# Patient Record
Sex: FEMALE | Race: WHITE | NOT HISPANIC OR LATINO | Employment: OTHER | URBAN - METROPOLITAN AREA
[De-identification: names, ages, dates, MRNs, and addresses within clinical notes are randomized per-mention and may not be internally consistent; named-entity substitution may affect disease eponyms.]

---

## 2021-09-23 ENCOUNTER — LAB REQUISITION (OUTPATIENT)
Dept: LAB | Facility: CLINIC | Age: 86
End: 2021-09-23
Payer: MEDICAID

## 2021-09-23 DIAGNOSIS — F03.90 UNSPECIFIED DEMENTIA WITHOUT BEHAVIORAL DISTURBANCE: ICD-10-CM

## 2021-09-23 DIAGNOSIS — E55.9 VITAMIN D DEFICIENCY, UNSPECIFIED: ICD-10-CM

## 2021-09-24 LAB
ALBUMIN SERPL-MCNC: 3.3 G/DL (ref 3.5–5)
ALP SERPL-CCNC: 89 U/L (ref 45–120)
ALT SERPL W P-5'-P-CCNC: 14 U/L (ref 0–45)
ANION GAP SERPL CALCULATED.3IONS-SCNC: 12 MMOL/L (ref 5–18)
AST SERPL W P-5'-P-CCNC: 18 U/L (ref 0–40)
BILIRUB SERPL-MCNC: 0.4 MG/DL (ref 0–1)
BUN SERPL-MCNC: 17 MG/DL (ref 8–28)
CALCIUM SERPL-MCNC: 8.6 MG/DL (ref 8.5–10.5)
CHLORIDE BLD-SCNC: 105 MMOL/L (ref 98–107)
CO2 SERPL-SCNC: 25 MMOL/L (ref 22–31)
CREAT SERPL-MCNC: 0.87 MG/DL (ref 0.6–1.1)
ERYTHROCYTE [DISTWIDTH] IN BLOOD BY AUTOMATED COUNT: 13.1 % (ref 10–15)
GFR SERPL CREATININE-BSD FRML MDRD: 60 ML/MIN/1.73M2
GLUCOSE BLD-MCNC: 92 MG/DL (ref 70–125)
HCT VFR BLD AUTO: 43.3 % (ref 35–47)
HGB BLD-MCNC: 14.2 G/DL (ref 11.7–15.7)
MCH RBC QN AUTO: 32.1 PG (ref 26.5–33)
MCHC RBC AUTO-ENTMCNC: 32.8 G/DL (ref 31.5–36.5)
MCV RBC AUTO: 98 FL (ref 78–100)
PLATELET # BLD AUTO: 314 10E3/UL (ref 150–450)
POTASSIUM BLD-SCNC: 4.2 MMOL/L (ref 3.5–5)
PROT SERPL-MCNC: 6.1 G/DL (ref 6–8)
RBC # BLD AUTO: 4.43 10E6/UL (ref 3.8–5.2)
SODIUM SERPL-SCNC: 142 MMOL/L (ref 136–145)
TSH SERPL DL<=0.005 MIU/L-ACNC: 3.48 UIU/ML (ref 0.3–5)
VIT B12 SERPL-MCNC: 269 PG/ML (ref 213–816)
WBC # BLD AUTO: 3.7 10E3/UL (ref 4–11)

## 2021-09-24 PROCEDURE — 80053 COMPREHEN METABOLIC PANEL: CPT | Mod: ORL | Performed by: INTERNAL MEDICINE

## 2021-09-24 PROCEDURE — 85027 COMPLETE CBC AUTOMATED: CPT | Mod: ORL | Performed by: INTERNAL MEDICINE

## 2021-09-24 PROCEDURE — 36415 COLL VENOUS BLD VENIPUNCTURE: CPT | Mod: ORL | Performed by: INTERNAL MEDICINE

## 2021-09-24 PROCEDURE — 82607 VITAMIN B-12: CPT | Mod: ORL | Performed by: INTERNAL MEDICINE

## 2021-09-24 PROCEDURE — 84443 ASSAY THYROID STIM HORMONE: CPT | Mod: ORL | Performed by: INTERNAL MEDICINE

## 2021-09-24 PROCEDURE — 82306 VITAMIN D 25 HYDROXY: CPT | Mod: ORL | Performed by: INTERNAL MEDICINE

## 2021-09-27 LAB — DEPRECATED CALCIDIOL+CALCIFEROL SERPL-MC: 13 UG/L (ref 30–80)

## 2022-03-03 ENCOUNTER — LAB REQUISITION (OUTPATIENT)
Dept: LAB | Facility: CLINIC | Age: 87
End: 2022-03-03
Payer: COMMERCIAL

## 2022-03-03 DIAGNOSIS — E03.9 HYPOTHYROIDISM, UNSPECIFIED: ICD-10-CM

## 2022-03-03 DIAGNOSIS — E55.9 VITAMIN D DEFICIENCY, UNSPECIFIED: ICD-10-CM

## 2022-03-03 DIAGNOSIS — I10 ESSENTIAL (PRIMARY) HYPERTENSION: ICD-10-CM

## 2022-03-04 LAB
ALBUMIN SERPL-MCNC: 3.3 G/DL (ref 3.5–5)
ALP SERPL-CCNC: 92 U/L (ref 45–120)
ALT SERPL W P-5'-P-CCNC: 9 U/L (ref 0–45)
ANION GAP SERPL CALCULATED.3IONS-SCNC: 9 MMOL/L (ref 5–18)
AST SERPL W P-5'-P-CCNC: 14 U/L (ref 0–40)
BILIRUB SERPL-MCNC: 0.3 MG/DL (ref 0–1)
BUN SERPL-MCNC: 14 MG/DL (ref 8–28)
CALCIUM SERPL-MCNC: 9.2 MG/DL (ref 8.5–10.5)
CHLORIDE BLD-SCNC: 103 MMOL/L (ref 98–107)
CO2 SERPL-SCNC: 27 MMOL/L (ref 22–31)
CREAT SERPL-MCNC: 0.9 MG/DL (ref 0.6–1.1)
ERYTHROCYTE [DISTWIDTH] IN BLOOD BY AUTOMATED COUNT: 13.9 % (ref 10–15)
GFR SERPL CREATININE-BSD FRML MDRD: 61 ML/MIN/1.73M2
GLUCOSE BLD-MCNC: 100 MG/DL (ref 70–125)
HCT VFR BLD AUTO: 43.4 % (ref 35–47)
HGB BLD-MCNC: 14.6 G/DL (ref 11.7–15.7)
MCH RBC QN AUTO: 31.5 PG (ref 26.5–33)
MCHC RBC AUTO-ENTMCNC: 33.6 G/DL (ref 31.5–36.5)
MCV RBC AUTO: 94 FL (ref 78–100)
PLATELET # BLD AUTO: 309 10E3/UL (ref 150–450)
POTASSIUM BLD-SCNC: 4 MMOL/L (ref 3.5–5)
PROT SERPL-MCNC: 6.7 G/DL (ref 6–8)
RBC # BLD AUTO: 4.64 10E6/UL (ref 3.8–5.2)
SODIUM SERPL-SCNC: 139 MMOL/L (ref 136–145)
TSH SERPL DL<=0.005 MIU/L-ACNC: 4.44 UIU/ML (ref 0.3–5)
WBC # BLD AUTO: 4.1 10E3/UL (ref 4–11)

## 2022-03-04 PROCEDURE — 84443 ASSAY THYROID STIM HORMONE: CPT | Mod: ORL | Performed by: PHYSICIAN ASSISTANT

## 2022-03-04 PROCEDURE — 85027 COMPLETE CBC AUTOMATED: CPT | Mod: ORL | Performed by: PHYSICIAN ASSISTANT

## 2022-03-04 PROCEDURE — 36415 COLL VENOUS BLD VENIPUNCTURE: CPT | Mod: ORL | Performed by: PHYSICIAN ASSISTANT

## 2022-03-04 PROCEDURE — P9604 ONE-WAY ALLOW PRORATED TRIP: HCPCS | Mod: ORL | Performed by: PHYSICIAN ASSISTANT

## 2022-03-04 PROCEDURE — 80053 COMPREHEN METABOLIC PANEL: CPT | Mod: ORL | Performed by: PHYSICIAN ASSISTANT

## 2022-03-04 PROCEDURE — 82306 VITAMIN D 25 HYDROXY: CPT | Mod: ORL | Performed by: PHYSICIAN ASSISTANT

## 2022-03-07 LAB — DEPRECATED CALCIDIOL+CALCIFEROL SERPL-MC: 27 UG/L (ref 30–80)

## 2022-11-02 ENCOUNTER — LAB REQUISITION (OUTPATIENT)
Dept: LAB | Facility: CLINIC | Age: 87
End: 2022-11-02
Payer: MEDICARE

## 2022-11-02 DIAGNOSIS — E55.9 VITAMIN D DEFICIENCY, UNSPECIFIED: ICD-10-CM

## 2022-11-02 DIAGNOSIS — F03.90 UNSPECIFIED DEMENTIA, UNSPECIFIED SEVERITY, WITHOUT BEHAVIORAL DISTURBANCE, PSYCHOTIC DISTURBANCE, MOOD DISTURBANCE, AND ANXIETY (H): ICD-10-CM

## 2022-11-04 LAB
ALBUMIN SERPL BCG-MCNC: 3.9 G/DL (ref 3.5–5.2)
ALP SERPL-CCNC: 80 U/L (ref 35–104)
ALT SERPL W P-5'-P-CCNC: 10 U/L (ref 10–35)
ANION GAP SERPL CALCULATED.3IONS-SCNC: 12 MMOL/L (ref 7–15)
AST SERPL W P-5'-P-CCNC: 18 U/L (ref 10–35)
BASOPHILS # BLD AUTO: 0 10E3/UL (ref 0–0.2)
BASOPHILS NFR BLD AUTO: 1 %
BILIRUB SERPL-MCNC: 0.2 MG/DL
BUN SERPL-MCNC: 21.1 MG/DL (ref 8–23)
CALCIUM SERPL-MCNC: 9.9 MG/DL (ref 8.8–10.2)
CHLORIDE SERPL-SCNC: 99 MMOL/L (ref 98–107)
CREAT SERPL-MCNC: 1.12 MG/DL (ref 0.51–0.95)
DEPRECATED CALCIDIOL+CALCIFEROL SERPL-MC: 37 UG/L (ref 20–75)
DEPRECATED HCO3 PLAS-SCNC: 26 MMOL/L (ref 22–29)
EOSINOPHIL # BLD AUTO: 0.1 10E3/UL (ref 0–0.7)
EOSINOPHIL NFR BLD AUTO: 2 %
ERYTHROCYTE [DISTWIDTH] IN BLOOD BY AUTOMATED COUNT: 13.2 % (ref 10–15)
GFR SERPL CREATININE-BSD FRML MDRD: 47 ML/MIN/1.73M2
GLUCOSE SERPL-MCNC: 100 MG/DL (ref 70–99)
HCT VFR BLD AUTO: 45.1 % (ref 35–47)
HGB BLD-MCNC: 14.9 G/DL (ref 11.7–15.7)
IMM GRANULOCYTES # BLD: 0 10E3/UL
IMM GRANULOCYTES NFR BLD: 0 %
LYMPHOCYTES # BLD AUTO: 1.5 10E3/UL (ref 0.8–5.3)
LYMPHOCYTES NFR BLD AUTO: 36 %
MCH RBC QN AUTO: 31.9 PG (ref 26.5–33)
MCHC RBC AUTO-ENTMCNC: 33 G/DL (ref 31.5–36.5)
MCV RBC AUTO: 97 FL (ref 78–100)
MONOCYTES # BLD AUTO: 0.5 10E3/UL (ref 0–1.3)
MONOCYTES NFR BLD AUTO: 12 %
NEUTROPHILS # BLD AUTO: 2.1 10E3/UL (ref 1.6–8.3)
NEUTROPHILS NFR BLD AUTO: 49 %
NRBC # BLD AUTO: 0 10E3/UL
NRBC BLD AUTO-RTO: 0 /100
PLATELET # BLD AUTO: 289 10E3/UL (ref 150–450)
POTASSIUM SERPL-SCNC: 4.3 MMOL/L (ref 3.4–5.3)
PROT SERPL-MCNC: 6.8 G/DL (ref 6.4–8.3)
RBC # BLD AUTO: 4.67 10E6/UL (ref 3.8–5.2)
SODIUM SERPL-SCNC: 137 MMOL/L (ref 136–145)
TSH SERPL DL<=0.005 MIU/L-ACNC: 5.88 UIU/ML (ref 0.3–4.2)
VIT B12 SERPL-MCNC: 432 PG/ML (ref 232–1245)
WBC # BLD AUTO: 4.3 10E3/UL (ref 4–11)

## 2022-11-04 PROCEDURE — P9603 ONE-WAY ALLOW PRORATED MILES: HCPCS | Mod: ORL | Performed by: INTERNAL MEDICINE

## 2022-11-04 PROCEDURE — 84443 ASSAY THYROID STIM HORMONE: CPT | Mod: ORL | Performed by: INTERNAL MEDICINE

## 2022-11-04 PROCEDURE — 80053 COMPREHEN METABOLIC PANEL: CPT | Mod: ORL | Performed by: INTERNAL MEDICINE

## 2022-11-04 PROCEDURE — 85025 COMPLETE CBC W/AUTO DIFF WBC: CPT | Mod: ORL | Performed by: INTERNAL MEDICINE

## 2022-11-04 PROCEDURE — 36415 COLL VENOUS BLD VENIPUNCTURE: CPT | Mod: ORL | Performed by: INTERNAL MEDICINE

## 2022-11-04 PROCEDURE — 82306 VITAMIN D 25 HYDROXY: CPT | Mod: ORL | Performed by: INTERNAL MEDICINE

## 2022-11-04 PROCEDURE — 82607 VITAMIN B-12: CPT | Mod: ORL | Performed by: INTERNAL MEDICINE

## 2022-12-07 ENCOUNTER — LAB REQUISITION (OUTPATIENT)
Dept: LAB | Facility: CLINIC | Age: 87
End: 2022-12-07
Payer: COMMERCIAL

## 2022-12-07 ENCOUNTER — LAB REQUISITION (OUTPATIENT)
Dept: LAB | Facility: CLINIC | Age: 87
End: 2022-12-07
Payer: MEDICARE

## 2022-12-07 DIAGNOSIS — E03.9 HYPOTHYROIDISM, UNSPECIFIED: ICD-10-CM

## 2022-12-07 DIAGNOSIS — F10.120 ALCOHOL ABUSE WITH INTOXICATION, UNCOMPLICATED (H): ICD-10-CM

## 2022-12-07 DIAGNOSIS — I10 ESSENTIAL (PRIMARY) HYPERTENSION: ICD-10-CM

## 2022-12-09 LAB
ALBUMIN SERPL BCG-MCNC: 3.8 G/DL (ref 3.5–5.2)
ALP SERPL-CCNC: 74 U/L (ref 35–104)
ALT SERPL W P-5'-P-CCNC: 8 U/L (ref 10–35)
ANION GAP SERPL CALCULATED.3IONS-SCNC: 11 MMOL/L (ref 7–15)
AST SERPL W P-5'-P-CCNC: 18 U/L (ref 10–35)
BILIRUB SERPL-MCNC: 0.3 MG/DL
BUN SERPL-MCNC: 25 MG/DL (ref 8–23)
CALCIUM SERPL-MCNC: 9.4 MG/DL (ref 8.8–10.2)
CHLORIDE SERPL-SCNC: 104 MMOL/L (ref 98–107)
CREAT SERPL-MCNC: 1.09 MG/DL (ref 0.51–0.95)
DEPRECATED HCO3 PLAS-SCNC: 27 MMOL/L (ref 22–29)
FOLATE SERPL-MCNC: 5.6 NG/ML (ref 4.6–34.8)
GFR SERPL CREATININE-BSD FRML MDRD: 48 ML/MIN/1.73M2
GLUCOSE SERPL-MCNC: 82 MG/DL (ref 70–99)
POTASSIUM SERPL-SCNC: 4.7 MMOL/L (ref 3.4–5.3)
PROT SERPL-MCNC: 6.7 G/DL (ref 6.4–8.3)
SODIUM SERPL-SCNC: 142 MMOL/L (ref 136–145)
T4 SERPL-MCNC: 4.6 UG/DL (ref 4.5–11.7)
TSH SERPL DL<=0.005 MIU/L-ACNC: 6.77 UIU/ML (ref 0.3–4.2)

## 2022-12-09 PROCEDURE — 84436 ASSAY OF TOTAL THYROXINE: CPT | Mod: ORL | Performed by: INTERNAL MEDICINE

## 2022-12-09 PROCEDURE — 80053 COMPREHEN METABOLIC PANEL: CPT | Mod: ORL | Performed by: INTERNAL MEDICINE

## 2022-12-09 PROCEDURE — P9603 ONE-WAY ALLOW PRORATED MILES: HCPCS | Mod: ORL | Performed by: INTERNAL MEDICINE

## 2022-12-09 PROCEDURE — 82746 ASSAY OF FOLIC ACID SERUM: CPT | Mod: ORL | Performed by: INTERNAL MEDICINE

## 2022-12-09 PROCEDURE — 36415 COLL VENOUS BLD VENIPUNCTURE: CPT | Mod: ORL | Performed by: INTERNAL MEDICINE

## 2022-12-09 PROCEDURE — 84443 ASSAY THYROID STIM HORMONE: CPT | Mod: ORL | Performed by: INTERNAL MEDICINE

## 2022-12-09 PROCEDURE — 80199 DRUG SCREEN QUANT TIAGABINE: CPT | Mod: ORL | Performed by: INTERNAL MEDICINE

## 2022-12-23 LAB — TIAGABINE SERPL-MCNC: NORMAL NG/ML

## 2023-02-02 ENCOUNTER — LAB REQUISITION (OUTPATIENT)
Dept: LAB | Facility: CLINIC | Age: 88
End: 2023-02-02
Payer: MEDICARE

## 2023-02-02 DIAGNOSIS — E03.9 HYPOTHYROIDISM, UNSPECIFIED: ICD-10-CM

## 2023-02-03 LAB
T3 SERPL-MCNC: 58 NG/DL (ref 85–202)
T4 FREE SERPL-MCNC: 0.81 NG/DL (ref 0.9–1.7)
TSH SERPL DL<=0.005 MIU/L-ACNC: 4.94 UIU/ML (ref 0.3–4.2)

## 2023-02-03 PROCEDURE — 84439 ASSAY OF FREE THYROXINE: CPT | Mod: ORL | Performed by: INTERNAL MEDICINE

## 2023-02-03 PROCEDURE — 36415 COLL VENOUS BLD VENIPUNCTURE: CPT | Mod: ORL | Performed by: INTERNAL MEDICINE

## 2023-02-03 PROCEDURE — 84443 ASSAY THYROID STIM HORMONE: CPT | Mod: ORL | Performed by: INTERNAL MEDICINE

## 2023-02-03 PROCEDURE — P9603 ONE-WAY ALLOW PRORATED MILES: HCPCS | Mod: ORL | Performed by: INTERNAL MEDICINE

## 2023-02-03 PROCEDURE — 84480 ASSAY TRIIODOTHYRONINE (T3): CPT | Mod: ORL | Performed by: INTERNAL MEDICINE

## 2023-03-23 ENCOUNTER — LAB REQUISITION (OUTPATIENT)
Dept: LAB | Facility: CLINIC | Age: 88
End: 2023-03-23
Payer: MEDICARE

## 2023-03-23 DIAGNOSIS — E03.9 HYPOTHYROIDISM, UNSPECIFIED: ICD-10-CM

## 2023-03-24 LAB
TSH SERPL DL<=0.005 MIU/L-ACNC: 3.9 UIU/ML (ref 0.3–4.2)
VIT B12 SERPL-MCNC: 460 PG/ML (ref 232–1245)

## 2023-03-24 PROCEDURE — 36415 COLL VENOUS BLD VENIPUNCTURE: CPT | Mod: ORL | Performed by: INTERNAL MEDICINE

## 2023-03-24 PROCEDURE — P9604 ONE-WAY ALLOW PRORATED TRIP: HCPCS | Mod: ORL | Performed by: INTERNAL MEDICINE

## 2023-03-24 PROCEDURE — 82607 VITAMIN B-12: CPT | Mod: ORL | Performed by: INTERNAL MEDICINE

## 2023-03-24 PROCEDURE — 84443 ASSAY THYROID STIM HORMONE: CPT | Mod: ORL | Performed by: INTERNAL MEDICINE

## 2023-08-10 ENCOUNTER — LAB REQUISITION (OUTPATIENT)
Dept: LAB | Facility: CLINIC | Age: 88
End: 2023-08-10
Payer: MEDICARE

## 2023-08-10 DIAGNOSIS — F03.90 UNSPECIFIED DEMENTIA, UNSPECIFIED SEVERITY, WITHOUT BEHAVIORAL DISTURBANCE, PSYCHOTIC DISTURBANCE, MOOD DISTURBANCE, AND ANXIETY (H): ICD-10-CM

## 2023-08-11 LAB
ALBUMIN SERPL BCG-MCNC: 3.6 G/DL (ref 3.5–5.2)
ALP SERPL-CCNC: 67 U/L (ref 35–104)
ALT SERPL W P-5'-P-CCNC: 10 U/L (ref 0–50)
ANION GAP SERPL CALCULATED.3IONS-SCNC: 10 MMOL/L (ref 7–15)
AST SERPL W P-5'-P-CCNC: 22 U/L (ref 0–45)
BASOPHILS # BLD AUTO: 0 10E3/UL (ref 0–0.2)
BASOPHILS NFR BLD AUTO: 1 %
BILIRUB SERPL-MCNC: 0.2 MG/DL
BUN SERPL-MCNC: 14.7 MG/DL (ref 8–23)
CALCIUM SERPL-MCNC: 9.1 MG/DL (ref 8.2–9.6)
CHLORIDE SERPL-SCNC: 104 MMOL/L (ref 98–107)
CREAT SERPL-MCNC: 0.97 MG/DL (ref 0.51–0.95)
DEPRECATED HCO3 PLAS-SCNC: 25 MMOL/L (ref 22–29)
EOSINOPHIL # BLD AUTO: 0.2 10E3/UL (ref 0–0.7)
EOSINOPHIL NFR BLD AUTO: 4 %
ERYTHROCYTE [DISTWIDTH] IN BLOOD BY AUTOMATED COUNT: 13.3 % (ref 10–15)
GFR SERPL CREATININE-BSD FRML MDRD: 55 ML/MIN/1.73M2
GLUCOSE SERPL-MCNC: 97 MG/DL (ref 70–99)
HCT VFR BLD AUTO: 44.8 % (ref 35–47)
HGB BLD-MCNC: 14.2 G/DL (ref 11.7–15.7)
IMM GRANULOCYTES # BLD: 0 10E3/UL
IMM GRANULOCYTES NFR BLD: 0 %
LYMPHOCYTES # BLD AUTO: 1.7 10E3/UL (ref 0.8–5.3)
LYMPHOCYTES NFR BLD AUTO: 42 %
MCH RBC QN AUTO: 30.4 PG (ref 26.5–33)
MCHC RBC AUTO-ENTMCNC: 31.7 G/DL (ref 31.5–36.5)
MCV RBC AUTO: 96 FL (ref 78–100)
MONOCYTES # BLD AUTO: 0.6 10E3/UL (ref 0–1.3)
MONOCYTES NFR BLD AUTO: 14 %
NEUTROPHILS # BLD AUTO: 1.6 10E3/UL (ref 1.6–8.3)
NEUTROPHILS NFR BLD AUTO: 39 %
NRBC # BLD AUTO: 0 10E3/UL
NRBC BLD AUTO-RTO: 0 /100
PLATELET # BLD AUTO: 274 10E3/UL (ref 150–450)
POTASSIUM SERPL-SCNC: 3.8 MMOL/L (ref 3.4–5.3)
PROT SERPL-MCNC: 6.4 G/DL (ref 6.4–8.3)
RBC # BLD AUTO: 4.67 10E6/UL (ref 3.8–5.2)
SODIUM SERPL-SCNC: 139 MMOL/L (ref 136–145)
TSH SERPL DL<=0.005 MIU/L-ACNC: 3.99 UIU/ML (ref 0.3–4.2)
WBC # BLD AUTO: 4.1 10E3/UL (ref 4–11)

## 2023-08-11 PROCEDURE — 36415 COLL VENOUS BLD VENIPUNCTURE: CPT | Mod: ORL | Performed by: INTERNAL MEDICINE

## 2023-08-11 PROCEDURE — 80053 COMPREHEN METABOLIC PANEL: CPT | Mod: ORL | Performed by: INTERNAL MEDICINE

## 2023-08-11 PROCEDURE — P9604 ONE-WAY ALLOW PRORATED TRIP: HCPCS | Mod: ORL | Performed by: INTERNAL MEDICINE

## 2023-08-11 PROCEDURE — 84443 ASSAY THYROID STIM HORMONE: CPT | Mod: ORL | Performed by: INTERNAL MEDICINE

## 2023-08-11 PROCEDURE — 85025 COMPLETE CBC W/AUTO DIFF WBC: CPT | Mod: ORL | Performed by: INTERNAL MEDICINE

## 2024-02-08 ENCOUNTER — LAB REQUISITION (OUTPATIENT)
Dept: LAB | Facility: CLINIC | Age: 89
End: 2024-02-08
Payer: MEDICARE

## 2024-02-08 DIAGNOSIS — E55.9 VITAMIN D DEFICIENCY, UNSPECIFIED: ICD-10-CM

## 2024-02-08 DIAGNOSIS — F03.90 UNSPECIFIED DEMENTIA, UNSPECIFIED SEVERITY, WITHOUT BEHAVIORAL DISTURBANCE, PSYCHOTIC DISTURBANCE, MOOD DISTURBANCE, AND ANXIETY (H): ICD-10-CM

## 2024-02-09 LAB
ALBUMIN SERPL BCG-MCNC: 3.7 G/DL (ref 3.5–5.2)
ALP SERPL-CCNC: 65 U/L (ref 40–150)
ALT SERPL W P-5'-P-CCNC: 9 U/L (ref 0–50)
ANION GAP SERPL CALCULATED.3IONS-SCNC: 9 MMOL/L (ref 7–15)
AST SERPL W P-5'-P-CCNC: 17 U/L (ref 0–45)
BASOPHILS # BLD AUTO: 0.1 10E3/UL (ref 0–0.2)
BASOPHILS NFR BLD AUTO: 1 %
BILIRUB SERPL-MCNC: 0.3 MG/DL
BUN SERPL-MCNC: 17 MG/DL (ref 8–23)
CALCIUM SERPL-MCNC: 9.4 MG/DL (ref 8.2–9.6)
CHLORIDE SERPL-SCNC: 104 MMOL/L (ref 98–107)
CREAT SERPL-MCNC: 1.06 MG/DL (ref 0.51–0.95)
DEPRECATED HCO3 PLAS-SCNC: 29 MMOL/L (ref 22–29)
EGFRCR SERPLBLD CKD-EPI 2021: 50 ML/MIN/1.73M2
EOSINOPHIL # BLD AUTO: 0.1 10E3/UL (ref 0–0.7)
EOSINOPHIL NFR BLD AUTO: 3 %
ERYTHROCYTE [DISTWIDTH] IN BLOOD BY AUTOMATED COUNT: 12.9 % (ref 10–15)
GLUCOSE SERPL-MCNC: 92 MG/DL (ref 70–99)
HCT VFR BLD AUTO: 43.6 % (ref 35–47)
HGB BLD-MCNC: 14.4 G/DL (ref 11.7–15.7)
IMM GRANULOCYTES # BLD: 0 10E3/UL
IMM GRANULOCYTES NFR BLD: 0 %
LYMPHOCYTES # BLD AUTO: 1.9 10E3/UL (ref 0.8–5.3)
LYMPHOCYTES NFR BLD AUTO: 48 %
MCH RBC QN AUTO: 31.2 PG (ref 26.5–33)
MCHC RBC AUTO-ENTMCNC: 33 G/DL (ref 31.5–36.5)
MCV RBC AUTO: 94 FL (ref 78–100)
MONOCYTES # BLD AUTO: 0.5 10E3/UL (ref 0–1.3)
MONOCYTES NFR BLD AUTO: 14 %
NEUTROPHILS # BLD AUTO: 1.3 10E3/UL (ref 1.6–8.3)
NEUTROPHILS NFR BLD AUTO: 34 %
NRBC # BLD AUTO: 0 10E3/UL
NRBC BLD AUTO-RTO: 0 /100
PLATELET # BLD AUTO: 271 10E3/UL (ref 150–450)
POTASSIUM SERPL-SCNC: 4.3 MMOL/L (ref 3.4–5.3)
PROT SERPL-MCNC: 6.5 G/DL (ref 6.4–8.3)
RBC # BLD AUTO: 4.62 10E6/UL (ref 3.8–5.2)
SODIUM SERPL-SCNC: 142 MMOL/L (ref 135–145)
TSH SERPL DL<=0.005 MIU/L-ACNC: 2.28 UIU/ML (ref 0.3–4.2)
VIT B12 SERPL-MCNC: 379 PG/ML (ref 232–1245)
VIT D+METAB SERPL-MCNC: 15 NG/ML (ref 20–50)
WBC # BLD AUTO: 3.9 10E3/UL (ref 4–11)

## 2024-02-09 PROCEDURE — 80053 COMPREHEN METABOLIC PANEL: CPT | Mod: ORL | Performed by: INTERNAL MEDICINE

## 2024-02-09 PROCEDURE — 82306 VITAMIN D 25 HYDROXY: CPT | Mod: ORL | Performed by: INTERNAL MEDICINE

## 2024-02-09 PROCEDURE — 36415 COLL VENOUS BLD VENIPUNCTURE: CPT | Mod: ORL | Performed by: INTERNAL MEDICINE

## 2024-02-09 PROCEDURE — 85025 COMPLETE CBC W/AUTO DIFF WBC: CPT | Mod: ORL | Performed by: INTERNAL MEDICINE

## 2024-02-09 PROCEDURE — 82607 VITAMIN B-12: CPT | Mod: ORL | Performed by: INTERNAL MEDICINE

## 2024-02-09 PROCEDURE — P9604 ONE-WAY ALLOW PRORATED TRIP: HCPCS | Mod: ORL | Performed by: INTERNAL MEDICINE

## 2024-02-09 PROCEDURE — 84443 ASSAY THYROID STIM HORMONE: CPT | Mod: ORL | Performed by: INTERNAL MEDICINE

## 2024-11-12 ENCOUNTER — LAB REQUISITION (OUTPATIENT)
Dept: LAB | Facility: CLINIC | Age: 89
End: 2024-11-12
Payer: COMMERCIAL

## 2024-11-12 DIAGNOSIS — F03.90 UNSPECIFIED DEMENTIA, UNSPECIFIED SEVERITY, WITHOUT BEHAVIORAL DISTURBANCE, PSYCHOTIC DISTURBANCE, MOOD DISTURBANCE, AND ANXIETY (H): ICD-10-CM

## 2024-11-12 DIAGNOSIS — E55.9 VITAMIN D DEFICIENCY, UNSPECIFIED: ICD-10-CM

## 2024-11-14 ENCOUNTER — APPOINTMENT (OUTPATIENT)
Dept: CT IMAGING | Facility: HOSPITAL | Age: 89
End: 2024-11-14
Attending: EMERGENCY MEDICINE
Payer: MEDICARE

## 2024-11-14 ENCOUNTER — HOSPITAL ENCOUNTER (INPATIENT)
Facility: HOSPITAL | Age: 89
End: 2024-11-14
Attending: EMERGENCY MEDICINE | Admitting: FAMILY MEDICINE
Payer: MEDICARE

## 2024-11-14 ENCOUNTER — APPOINTMENT (OUTPATIENT)
Dept: MRI IMAGING | Facility: HOSPITAL | Age: 89
End: 2024-11-14
Payer: MEDICARE

## 2024-11-14 DIAGNOSIS — I48.91 ATRIAL FIBRILLATION WITH RVR (H): ICD-10-CM

## 2024-11-14 DIAGNOSIS — I63.9 ACUTE STROKE DUE TO ISCHEMIA (H): ICD-10-CM

## 2024-11-14 DIAGNOSIS — Z86.59 HISTORY OF DEMENTIA: ICD-10-CM

## 2024-11-14 LAB
ALBUMIN SERPL BCG-MCNC: 3.9 G/DL (ref 3.5–5.2)
ALBUMIN UR-MCNC: 10 MG/DL
ALP SERPL-CCNC: 69 U/L (ref 40–150)
ALT SERPL W P-5'-P-CCNC: 10 U/L (ref 0–50)
ANION GAP SERPL CALCULATED.3IONS-SCNC: 12 MMOL/L (ref 7–15)
APPEARANCE UR: CLEAR
AST SERPL W P-5'-P-CCNC: 17 U/L (ref 0–45)
BILIRUB SERPL-MCNC: 0.4 MG/DL
BILIRUB UR QL STRIP: NEGATIVE
BUN SERPL-MCNC: 13.1 MG/DL (ref 8–23)
CALCIUM SERPL-MCNC: 9.1 MG/DL (ref 8.8–10.4)
CHLORIDE SERPL-SCNC: 102 MMOL/L (ref 98–107)
CHOLEST SERPL-MCNC: 366 MG/DL
COLOR UR AUTO: YELLOW
CREAT SERPL-MCNC: 1.2 MG/DL (ref 0.51–0.95)
EGFRCR SERPLBLD CKD-EPI 2021: 43 ML/MIN/1.73M2
ERYTHROCYTE [DISTWIDTH] IN BLOOD BY AUTOMATED COUNT: 13.2 % (ref 10–15)
EST. AVERAGE GLUCOSE BLD GHB EST-MCNC: 126 MG/DL
GLUCOSE SERPL-MCNC: 141 MG/DL (ref 70–99)
GLUCOSE UR STRIP-MCNC: NEGATIVE MG/DL
GRANULAR CAST: 3 /LPF
HBA1C MFR BLD: 6 %
HCO3 SERPL-SCNC: 25 MMOL/L (ref 22–29)
HCT VFR BLD AUTO: 47.6 % (ref 35–47)
HDLC SERPL-MCNC: 45 MG/DL
HGB BLD-MCNC: 15.6 G/DL (ref 11.7–15.7)
HGB UR QL STRIP: NEGATIVE
HOLD SPECIMEN: NORMAL
HYALINE CASTS: 1 /LPF
INR PPP: 0.99 (ref 0.85–1.15)
KETONES UR STRIP-MCNC: NEGATIVE MG/DL
LDLC SERPL CALC-MCNC: 284 MG/DL
LEUKOCYTE ESTERASE UR QL STRIP: NEGATIVE
MAGNESIUM SERPL-MCNC: 1.9 MG/DL (ref 1.7–2.3)
MCH RBC QN AUTO: 30.8 PG (ref 26.5–33)
MCHC RBC AUTO-ENTMCNC: 32.8 G/DL (ref 31.5–36.5)
MCV RBC AUTO: 94 FL (ref 78–100)
MUCOUS THREADS #/AREA URNS LPF: PRESENT /LPF
NITRATE UR QL: NEGATIVE
NONHDLC SERPL-MCNC: 321 MG/DL
NT-PROBNP SERPL-MCNC: 1263 PG/ML (ref 0–1800)
PH UR STRIP: 6.5 [PH] (ref 5–7)
PLATELET # BLD AUTO: 262 10E3/UL (ref 150–450)
POTASSIUM SERPL-SCNC: 3.7 MMOL/L (ref 3.4–5.3)
PROT SERPL-MCNC: 6.8 G/DL (ref 6.4–8.3)
RBC # BLD AUTO: 5.06 10E6/UL (ref 3.8–5.2)
RBC URINE: 1 /HPF
SODIUM SERPL-SCNC: 139 MMOL/L (ref 135–145)
SP GR UR STRIP: 1.01 (ref 1–1.03)
T4 FREE SERPL-MCNC: 0.73 NG/DL (ref 0.9–1.7)
TRIGL SERPL-MCNC: 183 MG/DL
TROPONIN T SERPL HS-MCNC: 16 NG/L
TROPONIN T SERPL HS-MCNC: 17 NG/L
TROPONIN T SERPL HS-MCNC: 20 NG/L
TSH SERPL DL<=0.005 MIU/L-ACNC: 8.45 UIU/ML (ref 0.3–4.2)
UROBILINOGEN UR STRIP-MCNC: <2 MG/DL
WBC # BLD AUTO: 4.9 10E3/UL (ref 4–11)
WBC URINE: 1 /HPF

## 2024-11-14 PROCEDURE — 36415 COLL VENOUS BLD VENIPUNCTURE: CPT

## 2024-11-14 PROCEDURE — 85014 HEMATOCRIT: CPT | Performed by: EMERGENCY MEDICINE

## 2024-11-14 PROCEDURE — 70553 MRI BRAIN STEM W/O & W/DYE: CPT | Mod: MG

## 2024-11-14 PROCEDURE — BW191ZZ FLUOROSCOPY OF HEAD AND NECK USING LOW OSMOLAR CONTRAST: ICD-10-PCS | Performed by: RADIOLOGY

## 2024-11-14 PROCEDURE — 85041 AUTOMATED RBC COUNT: CPT | Performed by: EMERGENCY MEDICINE

## 2024-11-14 PROCEDURE — 82465 ASSAY BLD/SERUM CHOLESTEROL: CPT

## 2024-11-14 PROCEDURE — 70496 CT ANGIOGRAPHY HEAD: CPT | Mod: MG

## 2024-11-14 PROCEDURE — 250N000011 HC RX IP 250 OP 636

## 2024-11-14 PROCEDURE — 85610 PROTHROMBIN TIME: CPT | Performed by: EMERGENCY MEDICINE

## 2024-11-14 PROCEDURE — A9585 GADOBUTROL INJECTION: HCPCS

## 2024-11-14 PROCEDURE — 84155 ASSAY OF PROTEIN SERUM: CPT | Performed by: EMERGENCY MEDICINE

## 2024-11-14 PROCEDURE — 84484 ASSAY OF TROPONIN QUANT: CPT

## 2024-11-14 PROCEDURE — 99207 PR NO CHARGE LOS: CPT

## 2024-11-14 PROCEDURE — 255N000002 HC RX 255 OP 636

## 2024-11-14 PROCEDURE — 84484 ASSAY OF TROPONIN QUANT: CPT | Performed by: EMERGENCY MEDICINE

## 2024-11-14 PROCEDURE — 84443 ASSAY THYROID STIM HORMONE: CPT | Performed by: EMERGENCY MEDICINE

## 2024-11-14 PROCEDURE — 82247 BILIRUBIN TOTAL: CPT | Performed by: EMERGENCY MEDICINE

## 2024-11-14 PROCEDURE — 36415 COLL VENOUS BLD VENIPUNCTURE: CPT | Performed by: EMERGENCY MEDICINE

## 2024-11-14 PROCEDURE — 93005 ELECTROCARDIOGRAM TRACING: CPT | Performed by: STUDENT IN AN ORGANIZED HEALTH CARE EDUCATION/TRAINING PROGRAM

## 2024-11-14 PROCEDURE — 96374 THER/PROPH/DIAG INJ IV PUSH: CPT | Mod: 59

## 2024-11-14 PROCEDURE — 250N000013 HC RX MED GY IP 250 OP 250 PS 637: Performed by: EMERGENCY MEDICINE

## 2024-11-14 PROCEDURE — 80061 LIPID PANEL: CPT

## 2024-11-14 PROCEDURE — 84439 ASSAY OF FREE THYROXINE: CPT | Performed by: EMERGENCY MEDICINE

## 2024-11-14 PROCEDURE — 81001 URINALYSIS AUTO W/SCOPE: CPT | Performed by: EMERGENCY MEDICINE

## 2024-11-14 PROCEDURE — 83036 HEMOGLOBIN GLYCOSYLATED A1C: CPT

## 2024-11-14 PROCEDURE — 83735 ASSAY OF MAGNESIUM: CPT | Performed by: EMERGENCY MEDICINE

## 2024-11-14 PROCEDURE — G1010 CDSM STANSON: HCPCS

## 2024-11-14 PROCEDURE — 258N000003 HC RX IP 258 OP 636: Performed by: EMERGENCY MEDICINE

## 2024-11-14 PROCEDURE — 83880 ASSAY OF NATRIURETIC PEPTIDE: CPT | Performed by: EMERGENCY MEDICINE

## 2024-11-14 PROCEDURE — 250N000011 HC RX IP 250 OP 636: Performed by: EMERGENCY MEDICINE

## 2024-11-14 PROCEDURE — 120N000004 HC R&B MS OVERFLOW

## 2024-11-14 PROCEDURE — 82040 ASSAY OF SERUM ALBUMIN: CPT | Performed by: EMERGENCY MEDICINE

## 2024-11-14 PROCEDURE — 99291 CRITICAL CARE FIRST HOUR: CPT | Mod: 25

## 2024-11-14 PROCEDURE — 70450 CT HEAD/BRAIN W/O DYE: CPT | Mod: MG

## 2024-11-14 PROCEDURE — 96361 HYDRATE IV INFUSION ADD-ON: CPT

## 2024-11-14 RX ORDER — DILTIAZEM HCL 60 MG
60 TABLET ORAL ONCE
Status: COMPLETED | OUTPATIENT
Start: 2024-11-14 | End: 2024-11-14

## 2024-11-14 RX ORDER — DILTIAZEM HYDROCHLORIDE 5 MG/ML
25 INJECTION INTRAVENOUS ONCE
Status: COMPLETED | OUTPATIENT
Start: 2024-11-14 | End: 2024-11-14

## 2024-11-14 RX ORDER — EMOLLIENT BASE
CREAM (GRAM) TOPICAL DAILY
COMMUNITY

## 2024-11-14 RX ORDER — LORAZEPAM 2 MG/ML
1 INJECTION INTRAMUSCULAR ONCE
Status: COMPLETED | OUTPATIENT
Start: 2024-11-14 | End: 2024-11-14

## 2024-11-14 RX ORDER — DONEPEZIL HYDROCHLORIDE 10 MG/1
10 TABLET, FILM COATED ORAL ONCE
COMMUNITY
Start: 2024-09-30

## 2024-11-14 RX ORDER — AMLODIPINE BESYLATE 10 MG/1
10 TABLET ORAL DAILY
Status: ON HOLD | COMMUNITY
End: 2024-11-20

## 2024-11-14 RX ORDER — DILTIAZEM HCL/D5W 125 MG/125
5-15 PLASTIC BAG, INJECTION (ML) INTRAVENOUS CONTINUOUS
Status: DISCONTINUED | OUTPATIENT
Start: 2024-11-14 | End: 2024-11-15

## 2024-11-14 RX ORDER — VENLAFAXINE HYDROCHLORIDE 150 MG/1
150 CAPSULE, EXTENDED RELEASE ORAL DAILY
Status: DISCONTINUED | OUTPATIENT
Start: 2024-11-15 | End: 2024-11-15

## 2024-11-14 RX ORDER — DILTIAZEM HYDROCHLORIDE 5 MG/ML
20 INJECTION INTRAVENOUS ONCE
Status: COMPLETED | OUTPATIENT
Start: 2024-11-14 | End: 2024-11-14

## 2024-11-14 RX ORDER — CALCIUM CARBONATE 500 MG/1
2 TABLET, CHEWABLE ORAL DAILY PRN
COMMUNITY

## 2024-11-14 RX ORDER — GADOBUTROL 604.72 MG/ML
9.5 INJECTION INTRAVENOUS ONCE
Status: COMPLETED | OUTPATIENT
Start: 2024-11-14 | End: 2024-11-14

## 2024-11-14 RX ORDER — HYDROXYZINE HYDROCHLORIDE 10 MG/1
10 TABLET, FILM COATED ORAL 2 TIMES DAILY
Status: DISCONTINUED | OUTPATIENT
Start: 2024-11-14 | End: 2024-11-15

## 2024-11-14 RX ORDER — IOPAMIDOL 755 MG/ML
75 INJECTION, SOLUTION INTRAVASCULAR ONCE
Status: COMPLETED | OUTPATIENT
Start: 2024-11-14 | End: 2024-11-14

## 2024-11-14 RX ORDER — HYDROXYZINE HYDROCHLORIDE 10 MG/1
10 TABLET, FILM COATED ORAL 2 TIMES DAILY
COMMUNITY
Start: 2022-12-06

## 2024-11-14 RX ORDER — BUSPIRONE HYDROCHLORIDE 10 MG/1
10 TABLET ORAL 2 TIMES DAILY
Status: DISCONTINUED | OUTPATIENT
Start: 2024-11-14 | End: 2024-11-15

## 2024-11-14 RX ORDER — MIRTAZAPINE 30 MG/1
30 TABLET, FILM COATED ORAL AT BEDTIME
COMMUNITY
Start: 2024-09-09

## 2024-11-14 RX ORDER — DONEPEZIL HYDROCHLORIDE 5 MG/1
10 TABLET, FILM COATED ORAL DAILY
Status: DISCONTINUED | OUTPATIENT
Start: 2024-11-15 | End: 2024-11-20 | Stop reason: HOSPADM

## 2024-11-14 RX ORDER — BUSPIRONE HYDROCHLORIDE 10 MG/1
10 TABLET ORAL 2 TIMES DAILY
COMMUNITY
Start: 2024-03-04

## 2024-11-14 RX ORDER — MIRTAZAPINE 7.5 MG/1
30 TABLET, FILM COATED ORAL AT BEDTIME
Status: DISCONTINUED | OUTPATIENT
Start: 2024-11-14 | End: 2024-11-20 | Stop reason: HOSPADM

## 2024-11-14 RX ORDER — TRIAMCINOLONE ACETONIDE 1 MG/G
CREAM TOPICAL 2 TIMES DAILY
COMMUNITY

## 2024-11-14 RX ORDER — ACETAMINOPHEN 325 MG/1
325-650 TABLET ORAL EVERY 8 HOURS PRN
COMMUNITY
Start: 2024-02-27

## 2024-11-14 RX ORDER — ASPIRIN 325 MG
325 TABLET, DELAYED RELEASE (ENTERIC COATED) ORAL DAILY PRN
COMMUNITY

## 2024-11-14 RX ORDER — LEVOTHYROXINE SODIUM 75 UG/1
75 TABLET ORAL DAILY
COMMUNITY
Start: 2023-03-06

## 2024-11-14 RX ORDER — LIDOCAINE 40 MG/G
CREAM TOPICAL
Status: DISCONTINUED | OUTPATIENT
Start: 2024-11-14 | End: 2024-11-20 | Stop reason: HOSPADM

## 2024-11-14 RX ORDER — ONDANSETRON 4 MG/1
4 TABLET, ORALLY DISINTEGRATING ORAL EVERY 6 HOURS PRN
Status: DISCONTINUED | OUTPATIENT
Start: 2024-11-14 | End: 2024-11-20 | Stop reason: HOSPADM

## 2024-11-14 RX ORDER — MULTIVIT WITH MINERALS/LUTEIN
1 TABLET ORAL 2 TIMES DAILY
COMMUNITY

## 2024-11-14 RX ORDER — ACETAMINOPHEN 650 MG/1
650 SUPPOSITORY RECTAL EVERY 4 HOURS PRN
Status: DISCONTINUED | OUTPATIENT
Start: 2024-11-14 | End: 2024-11-20 | Stop reason: HOSPADM

## 2024-11-14 RX ORDER — MULTIVIT WITH MINERALS/LUTEIN
1000 TABLET ORAL 2 TIMES DAILY
Status: DISCONTINUED | OUTPATIENT
Start: 2024-11-14 | End: 2024-11-17

## 2024-11-14 RX ORDER — LEVOTHYROXINE SODIUM 25 UG/1
75 TABLET ORAL DAILY
Status: DISCONTINUED | OUTPATIENT
Start: 2024-11-15 | End: 2024-11-20 | Stop reason: HOSPADM

## 2024-11-14 RX ORDER — CETIRIZINE HYDROCHLORIDE 10 MG/1
10 TABLET ORAL DAILY
Status: DISCONTINUED | OUTPATIENT
Start: 2024-11-15 | End: 2024-11-16

## 2024-11-14 RX ORDER — MINERAL OIL/HYDROPHIL PETROLAT
OINTMENT (GRAM) TOPICAL 2 TIMES DAILY PRN
COMMUNITY

## 2024-11-14 RX ORDER — VENLAFAXINE HYDROCHLORIDE 150 MG/1
150 CAPSULE, EXTENDED RELEASE ORAL DAILY
COMMUNITY

## 2024-11-14 RX ORDER — ASPIRIN 300 MG/1
300 SUPPOSITORY RECTAL ONCE
Status: COMPLETED | OUTPATIENT
Start: 2024-11-14 | End: 2024-11-14

## 2024-11-14 RX ORDER — ONDANSETRON 2 MG/ML
4 INJECTION INTRAMUSCULAR; INTRAVENOUS EVERY 6 HOURS PRN
Status: DISCONTINUED | OUTPATIENT
Start: 2024-11-14 | End: 2024-11-20 | Stop reason: HOSPADM

## 2024-11-14 RX ORDER — CETIRIZINE HYDROCHLORIDE 10 MG/1
10 TABLET ORAL DAILY
COMMUNITY

## 2024-11-14 RX ORDER — AMLODIPINE BESYLATE 5 MG/1
10 TABLET ORAL DAILY
Status: DISCONTINUED | OUTPATIENT
Start: 2024-11-15 | End: 2024-11-20 | Stop reason: HOSPADM

## 2024-11-14 RX ADMIN — ASPIRIN 300 MG: 300 SUPPOSITORY RECTAL at 16:53

## 2024-11-14 RX ADMIN — DILTIAZEM HYDROCHLORIDE 60 MG: 60 TABLET, FILM COATED ORAL at 14:32

## 2024-11-14 RX ADMIN — SODIUM CHLORIDE 500 ML: 9 INJECTION, SOLUTION INTRAVENOUS at 13:53

## 2024-11-14 RX ADMIN — DILTIAZEM HYDROCHLORIDE 25 MG: 5 INJECTION, SOLUTION INTRAVENOUS at 17:35

## 2024-11-14 RX ADMIN — Medication 5 MG/HR: at 20:22

## 2024-11-14 RX ADMIN — LORAZEPAM 1 MG: 2 INJECTION INTRAMUSCULAR; INTRAVENOUS at 22:37

## 2024-11-14 RX ADMIN — GADOBUTROL 9.5 ML: 604.72 INJECTION INTRAVENOUS at 23:26

## 2024-11-14 RX ADMIN — DILTIAZEM HYDROCHLORIDE 20 MG: 5 INJECTION, SOLUTION INTRAVENOUS at 13:41

## 2024-11-14 RX ADMIN — IOPAMIDOL 75 ML: 755 INJECTION, SOLUTION INTRAVENOUS at 15:58

## 2024-11-14 ASSESSMENT — ACTIVITIES OF DAILY LIVING (ADL)
ADLS_ACUITY_SCORE: 0

## 2024-11-14 ASSESSMENT — COLUMBIA-SUICIDE SEVERITY RATING SCALE - C-SSRS
6. HAVE YOU EVER DONE ANYTHING, STARTED TO DO ANYTHING, OR PREPARED TO DO ANYTHING TO END YOUR LIFE?: NO
2. HAVE YOU ACTUALLY HAD ANY THOUGHTS OF KILLING YOURSELF IN THE PAST MONTH?: NO
1. IN THE PAST MONTH, HAVE YOU WISHED YOU WERE DEAD OR WISHED YOU COULD GO TO SLEEP AND NOT WAKE UP?: NO

## 2024-11-14 NOTE — H&P
[facility]    History and Physical - Hospitalist Service       Date of Admission:  11/14/2024    Assessment & Plan      Markus Berkowitz is a 89 year old female admitted on 11/14/2024. She has a history of dementia, hypothyroidism, HTN, CKD3a, depression, and anxiety and is admitted for acute ischemic stroke of left parietal lobe likely due to cardio embolism in the setting of new onset atrial fibrillation with rapid ventricular response.     Acute ischemic stroke of left parietal lobe  Patient presented to the ED room from [facility] for altered mental status. Stroke code called due to nonsensical speech and dysarthria. CT Head showed Wedge-shaped focus of loss of gray-white matter differentiation left parietal lobe measuring 3.1 x 3 cm, concerning for an late acute/early subacute infarct. CTA notable for distal left M2 occlusion and 90% stenosis of left ICA. Suspect due to cardio embolism in the setting of new atrial fibrillation with RVR (see below). Stroke neurology recommended the following:   - Neurology IP Stroke Consult order   - Neurochecks and Vital Signs every 4 hours   - Permissive HTN; goal SBP < 220 mmHg  - Aspirin 300 mg suppository x 1 dose   - Statin: Pending LDL panel  - MRI Brain with and without contrast  - Defer to neurology regarding initiation of Anti-coagulation pending MRI brain results  - TTE   - Telemetry, EKG  - Bedside Glucose Monitoring  - A1c, Lipid Panel, Troponin x 3  - PT/OT/SLP  - Stroke Education  - Euthermia, Euglycemia  - Nutrition: Given dysarthria will need SLP evaluation before receiving oral medications     Atrial fibrillation with RVR  Patient found to be in atrial fibrillation with RVR upon arrival to the ED. HR 130s. EKG shows  Atrial fibrillation with RVR. Rate of 133. Normal QRS. Nonspecific ST segment flattening in the lateral leads. No prior tracing for comparison. Patient has no known history of A-fib or heart failure.  Initially, rate controlled in the ED with diltiazem. HR down into the 80s.  Considered switching to oral but patient currently n.p.o. pending swallow study with SLP.  Patient continued to have heart rate in 130s.  Plan to transition to continuous diltiazem infusion.  Can continue switching to po tomorrow.   - diltizem gtt.   - HR goal < 110  - complete echocardiogram   - cardiac telemetry   - can consider cardiology consult pending results of echo     Hypothyroidism   Patient has hx of hypothyroidism. PTA 75 mcg levothyroxine daily. TSH on admission elevated to 8.45. T4 0.73. Could consider increasing levothyroxine dose prior to discharge.   -PTA levothyroxine 75 mcg     Dementia   Anxiety   Depression  -PTA donepezil 10 mg daily  -PTA hydroxyzine 10 mg BID   -PTA venlafaxine 150 mg daily  -PTA mirtazapine 30 mg at bedtime     CKD3a  MARIA ELENA   Patient's Cr 1.2 on admission, up from baseline of 1.1. Likely prerenal. S/p 500 mL bolus in ED.   -strict I&Os  -consider mIVF if patient remains NPO   -avoid nephrotoxic medications     HTN:   PTA amlodipine 10 mg daily. Currently allowing permissive HTN, goal SBP < 220.     Goals of Care  Discussed with patient's daughter at bedside. She would like to have a conversation with other family members before making a decision. Due to dementia, patient does not currently have decisional making capacity. Patient has advance care directive that states she is DNR with selective medical treatment.   -Full code pending further discussion with family           Diet: NPO for Medical/Clinical Reasons Except for: No Exceptions  DVT Prophylaxis: Pneumatic Compression Devices  Griffiths Catheter: Not present  Fluids: s/p 500 mL bolus   Lines: None     Cardiac Monitoring: ACTIVE order. Indication: Stroke, acute (48 hours)  Code Status: Full Code    Clinically Significant Risk Factors Present on Admission                 # Drug Induced Platelet Defect: home medication list includes an antiplatelet  medication                        Disposition Plan      Expected Discharge Date: 11/16/2024                The patient's care was discussed with the Attending Physician, Dr. Ayala .      Edelmira Andersen MD  Hospitalist Service  [facility]  Securely message with [employer] (more info)  Text page via [facility] Paging/Directory   ______________________________________________________________________    Chief Complaint   Altered mental status     History is obtained from the patient's daughter     History of Present Illness   Markus Berkowitz is a 89 year old female who has a history of dementia, hypothyroidism, HTN, CKD3a,  who presented to the ED for increased confusion.     Patient has a history of dementia and currently resides at memory care facility. Staff noticed patient was more confused this AM. History difficulty to obtain due to confusion. Daughter reports this is not her baseline. She usually has clear speech and ambulates with a cane.     Patient has no prior history of stroke or cardiac problems.     Difficult to obtain full history as patient has underlying dementia.       Past Medical History    No past medical history on file.    Past Surgical History   No past surgical history on file.    Prior to Admission Medications   Prior to Admission Medications   Prescriptions Last Dose Informant Patient Reported? Taking?   acetaminophen (TYLENOL) 325 MG tablet 11/14/2024 Morning  Yes Yes   Sig: Take 325-650 mg by mouth every 8 hours as needed.   amLODIPine (NORVASC) 10 MG tablet 11/14/2024 Morning  Yes Yes   Sig: Take 10 mg by mouth daily.   aspirin (ASA) 325 MG EC tablet Unknown  Yes Yes   Sig: Take 325 mg by mouth daily as needed for moderate pain.   busPIRone (BUSPAR) 10 MG tablet 11/14/2024 Morning  Yes Yes   Sig: Take 10 mg by mouth 2 times daily.   calcium carbonate (TUMS) 500 MG chewable tablet Unknown  Yes Yes   Sig: Take 2 chew tab by mouth daily as needed for heartburn.    cetirizine (ZYRTEC) 10 MG tablet 11/14/2024 Morning  Yes Yes   Sig: Take 10 mg by mouth daily.   donepezil (ARICEPT) 10 MG tablet 11/14/2024 Noon  Yes Yes   Sig: Take 10 mg by mouth once.   emollient (VANICREAM) external cream 11/14/2024 Morning  Yes Yes   Sig: Apply topically daily. Apply to both legs for dry skin   hydrOXYzine HCl (ATARAX) 10 MG tablet 11/14/2024 Morning  Yes Yes   Sig: Take 10 mg by mouth 2 times daily.   levothyroxine (SYNTHROID/LEVOTHROID) 75 MCG tablet 11/14/2024 Morning  Yes Yes   Sig: Take 75 mcg by mouth daily.   mineral oil-hydrophilic petrolatum (AQUAPHOR) external ointment 11/14/2024 Morning  Yes Yes   Sig: Apply topically 2 times daily as needed for dry skin.   mirtazapine (REMERON) 30 MG tablet 11/13/2024 Bedtime  Yes Yes   Sig: Take 30 mg by mouth at bedtime.   triamcinolone (KENALOG) 0.1 % external cream 11/14/2024 Morning  Yes Yes   Sig: Apply topically 2 times daily.   venlafaxine (EFFEXOR XR) 150 MG 24 hr capsule 11/14/2024 Morning  Yes Yes   Sig: Take 150 mg by mouth daily.   vitamin E (TOCOPHEROL) 1000 units (450 mg) CAPS capsule 11/14/2024 Morning  Yes Yes   Sig: Take 1 capsule by mouth 2 times daily.      Facility-Administered Medications: None           Physical Exam   Vital Signs: Temp: 98.4  F (36.9  C) Temp src: Oral BP: (!) 133/102 Pulse: (!) 136   Resp: 26 SpO2: 95 % O2 Device: None (Room air)    Weight: 0 lbs 0 oz    GEN: Pleasant female, in no acute distress. Laying in bed  HEENT: Normocephalic, atraumatic. Extraoccular eye movements intact. Anicteric sclera. Moist mucous membranes.   NECK: Supple. No cervical or supraclavicular adenopathy.   PULM: Non-labored breathing. No use of accessory muscles. Clear to ausculation bilaterally. No wheezes or crackles.   CV: Irregularly irregular rhythm and tachycardic. Normal S1, S2. No rubs, murmurs, or gallops.    ABDOMEN: Normoactive bowel sounds. Non-tender to palpation. Non-distended.    EXTREMITES:  No clubbing, cyanosis,  or edema.    NEURO:  Awake. Alert. Not oriented to person, place, or time. No obvious focal deficits. Moving all extremities. Normal sensory function. Equal strength. CN intact. Nonsensical and slurred speech.   PSYCH: Calm. Appropriate affect, insight, judgment.       Medical Decision Making           Data     I have personally reviewed the following data over the past 24 hrs:    4.9  \   15.6   / 262     139 102 13.1 /  141 (H)   3.7 25 1.20 (H) \     ALT: 10 AST: 17 AP: 69 TBILI: 0.4   ALB: 3.9 TOT PROTEIN: 6.8 LIPASE: N/A     Trop: 16 (H) BNP: 1,263     TSH: 8.45 (H) T4: 0.73 (L) A1C: 6.0 (H)     INR:  0.99 PTT:  N/A   D-dimer:  N/A Fibrinogen:  N/A       Imaging results reviewed over the past 24 hrs:   Recent Results (from the past 24 hours)   Head CT w/o contrast    Narrative    EXAM: CT HEAD W/O CONTRAST  LOCATION: [facility]  DATE: 11/14/2024    INDICATION: Increased confusion  COMPARISON: None.  TECHNIQUE: Routine CT Head without IV contrast. Multiplanar reformats. Dose reduction techniques were used.    FINDINGS:  INTRACRANIAL CONTENTS: No evidence of acute intracranial hemorrhage or mass effect. Wedge-shaped focus of loss of gray-white matter differentiation left parietal lobe measuring 3.1 x 3 cm, concerning for an late acute/early subacute infarct. Scattered   foci of decreased attenuation within the cerebral hemispheric white matter which are nonspecific, though most commonly ascribed to small vessel ischemic disease. The ventricles are sulci are prominent consistent with moderate brain parenchymal volume   loss. The basilar cisterns are patent.    VISUALIZED ORBITS/SINUSES/MASTOIDS: The globes are unremarkable. The partially imaged paranasal sinuses, mastoid air cells and middle ear cavities are unremarkable.     BONES/SOFT TISSUES: The visualized skull base and calvarium are unremarkable.      Impression    IMPRESSION:    1.  Wedge-shaped focus of loss of gray-white matter  differentiation left parietal lobe measuring 3.1 x 3 cm, concerning for an late acute/early subacute infarct.  2.  No evidence of acute cranial hemorrhage or mass effect.  3.  Moderate brain parenchymal volume loss.   CTA Head Neck with Contrast    Narrative    EXAM: CTA HEAD NECK W CONTRAST  LOCATION: [facility]  DATE: 11/14/2024    INDICATION: recent stroke  COMPARISON: None.  CONTRAST: 75 mL [id number]  TECHNIQUE: Head and neck CT angiogram with IV contrast. Axial helical CT images of the head and neck vessels obtained during the arterial phase of intravenous contrast administration. Axial 2D reconstructed images and multiplanar 3D MIP reconstructed   images of the head and neck vessels were performed by the technologist. Dose reduction techniques were used. All stenosis measurements made according to NASCET criteria unless otherwise specified.    FINDINGS:   HEAD CTA:  Examination of the anterior circulation demonstrates flow within the bilateral internal carotid and proximal aspects of the anterior and middle cerebral arteries. Occlusion of the distal M2/proximal M3 branch of the left middle cerebral artery.    Examination of the posterior circulation demonstrates flow within the distal vertebral, basilar, and proximal aspects of the posterior cerebral arteries. The right and left posterior communicating arteries are not demonstrated with certainty. Moderate   stenosis of the P1/P2 branch of the left posterior cerebral artery.    No other evidence of pathologic vascular occlusion or saccular aneurysm within the visualized cranial circulation by CT angiography.    NECK CTA:  The aortic arch has normal branching configuration. There is flow within the brachiocephalic, common carotid, and proximal aspects of the subclavian arteries.    The right common carotid artery is patent. Examination of the right carotid bulb demonstrates no evidence of hemodynamically significant stenosis within the  right internal carotid artery within the neck.    The left common carotid artery is patent. Examination of the left carotid bulb demonstrates calcified and atherosclerotic plaque with approximately 85-90% stenosis of the carotid artery origin.    The right and left vertebral arteries are patent.    No evidence of arterial dissection.    The parotid, submandibular and thyroid glands are unremarkable.    No evidence of cervical lymphadenopathy by size or morphologic criteria.    The partially imaged lung apices are unremarkable.    No suspicious osseous lesions.      Impression    IMPRESSION:   HEAD CTA:   1.  Occlusion of the distal M2/proximal M3 branch of the left middle cerebral artery.  2.  Moderate stenosis of the P1/P2 branch of the left posterior cerebral artery.  3.  No other evidence of pathologic vascular occlusion or saccular aneurysm within the visualized intracranial circulation by CT angiography.    NECK CTA:  1.  Greater than 85-90% stenosis of the left internal carotid artery or origin from calcified plaque.  2.  No evidence of hemodynamically significant stenosis within the right internal carotid artery within the neck.    Dr. Mendez discussed the results with Dr. Das on 11/14/2024 4:07 PM CST by telephone.

## 2024-11-14 NOTE — MEDICATION SCRIBE - ADMISSION MEDICATION HISTORY
Medication Scribe Admission Medication History    Admission medication history is complete. The information provided in this note is only as accurate as the sources available at the time of the update.    Information Source(s): Facility (U/NH/) medication list/MAR and CareEverywhere/SureScripts via phone    Pertinent Information: pt come from [facility] [phone]    Changes made to PTA medication list:  Added: None  Deleted: None  Changed: None    Allergies reviewed with patient and updates made in EHR: yes    Medication History Completed By: KRISTA MARTINEZ 11/14/2024 5:41 PM    PTA Med List   Medication Sig Last Dose/Taking    acetaminophen (TYLENOL) 325 MG tablet Take 325-650 mg by mouth every 8 hours as needed. 11/14/2024 Morning    amLODIPine (NORVASC) 10 MG tablet Take 10 mg by mouth daily. 11/14/2024 Morning    aspirin (ASA) 325 MG EC tablet Take 325 mg by mouth daily as needed for moderate pain. Unknown    busPIRone (BUSPAR) 10 MG tablet Take 10 mg by mouth 2 times daily. 11/14/2024 Morning    calcium carbonate (TUMS) 500 MG chewable tablet Take 2 chew tab by mouth daily as needed for heartburn. Unknown    cetirizine (ZYRTEC) 10 MG tablet Take 10 mg by mouth daily. 11/14/2024 Morning    donepezil (ARICEPT) 10 MG tablet Take 10 mg by mouth once. 11/14/2024 Noon    emollient (VANICREAM) external cream Apply topically daily. Apply to both legs for dry skin 11/14/2024 Morning    hydrOXYzine HCl (ATARAX) 10 MG tablet Take 10 mg by mouth 2 times daily. 11/14/2024 Morning    levothyroxine (SYNTHROID/LEVOTHROID) 75 MCG tablet Take 75 mcg by mouth daily. 11/14/2024 Morning    mineral oil-hydrophilic petrolatum (AQUAPHOR) external ointment Apply topically 2 times daily as needed for dry skin. 11/14/2024 Morning    mirtazapine (REMERON) 30 MG tablet Take 30 mg by mouth at bedtime. 11/13/2024 Bedtime    triamcinolone (KENALOG) 0.1 % external cream Apply topically 2 times daily.  11/14/2024 Morning    venlafaxine (EFFEXOR XR) 150 MG 24 hr capsule Take 150 mg by mouth daily. 11/14/2024 Morning    vitamin E (TOCOPHEROL) 1000 units (450 mg) CAPS capsule Take 1 capsule by mouth 2 times daily. 11/14/2024 Morning

## 2024-11-14 NOTE — ED PROVIDER NOTES
EMERGENCY DEPARTMENT ENCOUNTER      NAME: Markus Berkowitz  AGE: 89 year old female  YOB: 1933  MRN: [id number]  EVALUATION DATE & TIME: 11/14/2024  1:01 PM    PCP: No primary care provider on file.    ED PROVIDER: Travis Das M.D.      Chief Complaint   Patient presents with    Altered Mental Status         FINAL IMPRESSION:  Subacute left parietal stroke  Dementia  Atrial fibrillation with RVR    ED COURSE & MEDICAL DECISION MAKING:    Pertinent Labs & Imaging studies reviewed. (See chart for details)  89 year old female presents to the Emergency Department for evaluation of change in mentation.  Patient elderly female at local [facility] care facility due to marked dementia.  Per report she seemed to be last responsive interactive today.  Per paramedic report patient was pleasant and cooperative ambulatory at the scene.  Patient unable provide any history here.  On arrival patient noted to be in atrial fibrillation with RVR.  This appears to be a new finding for her.  Could explain her symptomatology.  She is pleasant and cooperative.  Heart rate rapid and irregular.  Markedly confused.  Will proceed with rate control with intravenous diltiazem.  May need to do a diltiazem drip.  Baseline blood work being obtained to assess for contributory electrolyte imbalance, anemia.  Will obtain urine analysis also to assess for infectious process being contributory.  Patient may require hospitalization patient appears non toxic with stable vitals signs. Overall exam is benign.        1:19 PM I met with the patient for the initial interview and physical examination. Discussed plan for treatment and workup in the ED.    2:05 PM.  Heart rate has declined to approximately 110 after intravenous diltiazem.  Will proceed with p.o. labs diltiazem.  Daughter has since arrived.  Daughter reports mother is very altered compared to her typical self.  Usually able to converse.  Will proceed with CT imaging of the  head  3:03 PM.  UA without evidence of infection.  TSH slightly elevated 8.43 but likely not contributory.  BNP borderline at 1263.  Troponin minimally elevated at 20.  CBC and comprehensive metabolic panel unremarkable other than mild hyperglycemia and renal dysfunction.  Not likely contributory.  Awaiting CT imaging   3:25 PM.  Patient discussed with stroke neuro.  Recommends CTA of the head for additional clarity.  Discussed plan with daughter.  Daughter reports she had last seen her approximately 6 days ago.  3:40 PM.  Patient discussed with stroke neurology once again.  Recommends rectal aspirin suppository.  Plan for admission and completion of evaluation   3:45 PM.  Patient discussed with the family medicine resident.  They are agreeable plan for admission.  Patient be full admit.    4:08 PM.  Patient discussed with radiology.  Patient with distal left M2 occlusion.  Patient also with approximately 90% ICA stenosis.      At the conclusion of the encounter I discussed the results of all of the tests and the disposition. The questions were answered and return precautions provided. The patient or family acknowledged understanding and was agreeable with the care plan.       Patient represents a critical care situation.  Approximately 45 minutes spent directly involved in patient's care independent of procedures.  MEDICATIONS GIVEN IN THE EMERGENCY:  Medications - No data to display    NEW PRESCRIPTIONS STARTED AT TODAY'S ER VISIT  New Prescriptions    No medications on file          =================================================================    HPI          Markus Berkowitz is a 89 year old female with a pertinent medical history of dementia, who presents to the ED for evaluation of increased confusion.  Arrives by EMS from local assisted care facility.  Patient unable to provide information.  However she reports feeling well.  Denies any focal discomfort or pain.      REVIEW OF SYSTEMS   Unavailable  secondary to patient's underlying dementia  PAST MEDICAL HISTORY:  No past medical history on file.    PAST SURGICAL HISTORY:  No past surgical history on file.      CURRENT MEDICATIONS:    No current facility-administered medications for this encounter.  No current outpatient medications on file.    ALLERGIES:  Allergies   Allergen Reactions    Penicillins Unknown       FAMILY HISTORY:  No family history on file.    SOCIAL HISTORY:   Social History     Socioeconomic History    Marital status: Single       VITALS:  Patient Vitals for the past 24 hrs:   BP Temp Temp src Pulse Resp SpO2   11/14/24 1312 122/79 98.4  F (36.9  C) Oral (!) 134 18 96 %        PHYSICAL EXAM    Constitutional:  Awake, alert, in no apparent distress  HENT:  Normocephalic, Atraumatic. Bilateral external ears normal. Oropharynx moist. Nose normal. Neck- Normal range of motion with no guarding, No midline cervical tenderness, Supple, No stridor.   Eyes:  PERRL, EOMI with no signs of entrapment, Conjunctiva normal, No discharge.   Respiratory:  Normal breath sounds, No respiratory distress, No wheezing.    Cardiovascular:  Normal heart rate, Normal rhythm, No appreciable rubs or gallops.   GI:  Soft, No tenderness, No distension, No palpable masses  Musculoskeletal:  Intact distal pulses, No edema. Good range of motion in all major joints. No tenderness to palpation or major deformities noted.  Integument:  Warm, Dry, No erythema, No rash.   Neurologic:  Alert & oriented, Normal motor function, Normal sensory function, No focal deficits noted.   Psychiatric:  Affect normal, Judgment normal, Mood normal.     LAB:  All pertinent labs reviewed and interpreted.     Results for orders placed or performed during the hospital encounter of 11/14/24   Head CT w/o contrast     Status: None    Narrative    EXAM: CT HEAD W/O CONTRAST  LOCATION: [facility]  DATE: 11/14/2024    INDICATION: Increased confusion  COMPARISON:  None.  TECHNIQUE: Routine CT Head without IV contrast. Multiplanar reformats. Dose reduction techniques were used.    FINDINGS:  INTRACRANIAL CONTENTS: No evidence of acute intracranial hemorrhage or mass effect. Wedge-shaped focus of loss of gray-white matter differentiation left parietal lobe measuring 3.1 x 3 cm, concerning for an late acute/early subacute infarct. Scattered   foci of decreased attenuation within the cerebral hemispheric white matter which are nonspecific, though most commonly ascribed to small vessel ischemic disease. The ventricles are sulci are prominent consistent with moderate brain parenchymal volume   loss. The basilar cisterns are patent.    VISUALIZED ORBITS/SINUSES/MASTOIDS: The globes are unremarkable. The partially imaged paranasal sinuses, mastoid air cells and middle ear cavities are unremarkable.     BONES/SOFT TISSUES: The visualized skull base and calvarium are unremarkable.      Impression    IMPRESSION:    1.  Wedge-shaped focus of loss of gray-white matter differentiation left parietal lobe measuring 3.1 x 3 cm, concerning for an late acute/early subacute infarct.  2.  No evidence of acute cranial hemorrhage or mass effect.  3.  Moderate brain parenchymal volume loss.   INR     Status: Normal   Result Value Ref Range    INR 0.99 0.85 - 1.15   Comprehensive metabolic panel     Status: Abnormal   Result Value Ref Range    Sodium 139 135 - 145 mmol/L    Potassium 3.7 3.4 - 5.3 mmol/L    Carbon Dioxide (CO2) 25 22 - 29 mmol/L    Anion Gap 12 7 - 15 mmol/L    Urea Nitrogen 13.1 8.0 - 23.0 mg/dL    Creatinine 1.20 (H) 0.51 - 0.95 mg/dL    GFR Estimate 43 (L) >60 mL/min/1.73m2    Calcium 9.1 8.8 - 10.4 mg/dL    Chloride 102 98 - 107 mmol/L    Glucose 141 (H) 70 - 99 mg/dL    Alkaline Phosphatase 69 40 - 150 U/L    AST 17 0 - 45 U/L    ALT 10 0 - 50 U/L    Protein Total 6.8 6.4 - 8.3 g/dL    Albumin 3.9 3.5 - 5.2 g/dL    Bilirubin Total 0.4 <=1.2 mg/dL   Troponin T, High Sensitivity      Status: Abnormal   Result Value Ref Range    Troponin T, High Sensitivity 20 (H) <=14 ng/L   Magnesium     Status: Normal   Result Value Ref Range    Magnesium 1.9 1.7 - 2.3 mg/dL   TSH with free T4 reflex     Status: Abnormal   Result Value Ref Range    TSH 8.45 (H) 0.30 - 4.20 uIU/mL   Nt probnp inpatient (BNP)     Status: Normal   Result Value Ref Range    N terminal Pro BNP Inpatient 1,263 0 - 1,800 pg/mL   CBC (+ platelets, no diff)     Status: Abnormal   Result Value Ref Range    WBC Count 4.9 4.0 - 11.0 10e3/uL    RBC Count 5.06 3.80 - 5.20 10e6/uL    Hemoglobin 15.6 11.7 - 15.7 g/dL    Hematocrit 47.6 (H) 35.0 - 47.0 %    MCV 94 78 - 100 fL    MCH 30.8 26.5 - 33.0 pg    MCHC 32.8 31.5 - 36.5 g/dL    RDW 13.2 10.0 - 15.0 %    Platelet Count 262 150 - 450 10e3/uL   UA Macroscopic with reflex to Microscopic and Culture     Status: Abnormal    Specimen: Urine, Catheter   Result Value Ref Range    Color Urine Yellow Colorless, Straw, Light Yellow, Yellow    Appearance Urine Clear Clear    Glucose Urine Negative Negative mg/dL    Bilirubin Urine Negative Negative    Ketones Urine Negative Negative mg/dL    Specific Gravity Urine 1.015 1.001 - 1.030    Blood Urine Negative Negative    pH Urine 6.5 5.0 - 7.0    Protein Albumin Urine 10 (A) Negative mg/dL    Urobilinogen Urine <2.0 <2.0 mg/dL    Nitrite Urine Negative Negative    Leukocyte Esterase Urine Negative Negative    Mucus Urine Present (A) None Seen /LPF    RBC Urine 1 <=2 /HPF    WBC Urine 1 <=5 /HPF    Hyaline Casts Urine 1 <=2 /LPF    Granular Casts Urine 3 (H) None Seen /LPF    Narrative    Urine Culture not indicated   [place] Draw     Status: None    Narrative    The following orders were created for panel order [place] Draw.  Procedure                               Abnormality         Status                     ---------                               -----------         ------                     Extra Red Top Tube[[id number]]                                Final result                 Please view results for these tests on the individual orders.   Extra Red Top Tube     Status: None   Result Value Ref Range    Hold Specimen JIC    T4 free     Status: Abnormal   Result Value Ref Range    Free T4 0.73 (L) 0.90 - 1.70 ng/dL        RADIOLOGY:  Reviewed all pertinent imaging. Please see official radiology report.  No orders to display       EKG:    Atrial fibrillation with RVR.  Rate of 133.  Normal QRS.  Nonspecific ST segment flattening in the lateral leads.  No prior tracing for comparison.  I have independently reviewed and interpreted the EKG(s) documented above.           I, Tia Estrada, am serving as a scribe to document services personally performed by Travis Das MD, based on my observation and the provider's statements to me. I, Travis Das MD attest that Tia Estrada is acting in a scribe capacity, has observed my performance of the services and has documented them in accordance with my direction.    Travis Das M.D.  Emergency Medicine  [facility] EMERGENCY DEPARTMENT     Travis Das MD  11/14/24 1541       Travis Das MD  11/14/24 2329

## 2024-11-14 NOTE — CONSULTS
"[facility]    Stroke Telephone Note    I was called by Dr. Travis Das on 11/14/24 regarding patient Markus Berkowitz. The patient is a 89 year old female with a past medical history significant for dementia. History obtained from ED provider. Markus presents today from her memory care/nursing facility due to staff noting altered mental status.  On arrival to the ED she was found to be in new atrial fibrillation with RVR.  CT head revealed wedge-shaped area in the left parietal lobe concerning for late acute/early subacute infarct.  Daughter is at bedside and reports Markus is typically able to speak in complete sentences.  Daughter last saw the patient well 6 days prior to presentation.  On ED providers examination, patient is moving all extremities but speech is nonsensical and dysarthric.      Vitals  BP: 121/78   Pulse: 84   Resp: 24   Temp: 98.4  F (36.9  C)        Imaging Findings  CT head:   1.  Wedge-shaped focus of loss of gray-white matter differentiation left parietal lobe measuring 3.1 x 3 cm, concerning for an late acute/early subacute infarct.  2.  No evidence of acute cranial hemorrhage or mass effect.  3.  Moderate brain parenchymal volume loss.  CTA head/neck: Pending    Impression  Acute ischemic stroke of left parietal lobe suspect due to cardioembolism in the setting of new atrial fibrillation with RVR    Recommendations  - Use orderset: \"Ischemic Stroke Routine Admission\" or \"Ischemic Stroke No Thrombolytics/No Thrombectomy ICU Admission\"  - Place Neurology IP Stroke Consult order   - Neurochecks and Vital Signs every 4 hours   - Permissive HTN; goal SBP < 220 mmHg  - Aspirin 300 mg suppository x 1 dose   - Statin: Pending LDL panel  - MRI Brain with and without contrast  - Defer to [facility] inpatient neurology regarding initiation of Anti-coagulation pending MRI brain results  - TTE (with Bubble Study if age 60 yrs or less)  - Telemetry, EKG  - Bedside Glucose " "Monitoring  - A1c, Lipid Panel, Troponin x 3  - PT/OT/SLP  - Stroke Education  - Euthermia, Euglycemia  - Nutrition: Given dysarthria will need SLP evaluation before receiving oral medications     Case discussed with vascular neurology attending Dr. Kam.    My recommendations are based on the information provided over the phone by Markus Berkowitz's in-person providers. They are not intended to replace the clinical judgment of her in-person providers. I was not requested to personally see or examine the patient at this time.     Livia Fletcher PA-C  Vascular Neurology    To page me or covering stroke neurology team member, click here: [facility]COM  Choose \"On Call\" tab at top, then select \"NEUROLOGY/ALL SITES\" from middle drop-down box, press Enter, then look for \"stroke\" or \"telestroke\" for your site.   "

## 2024-11-14 NOTE — ED TRIAGE NOTES
Patient is from a care facility--were called for altered mental status--when medic arrived patient was alert--confused--from memory care-staff were very unhelpful regarding the story according to medics--was placed on the monitor and is in afib 110's-150's--BP initially was 80's systolic--after a small amount of fluid 's so fluids stopped--blood sugar=128--Patient arrives alert-confused-no complaints of pain.

## 2024-11-15 ENCOUNTER — APPOINTMENT (OUTPATIENT)
Dept: SPEECH THERAPY | Facility: HOSPITAL | Age: 89
End: 2024-11-15
Payer: MEDICARE

## 2024-11-15 ENCOUNTER — DOCUMENTATION ONLY (OUTPATIENT)
Dept: OTHER | Facility: CLINIC | Age: 89
End: 2024-11-15
Payer: COMMERCIAL

## 2024-11-15 LAB
ANION GAP SERPL CALCULATED.3IONS-SCNC: 13 MMOL/L (ref 7–15)
ANION GAP SERPL CALCULATED.3IONS-SCNC: 13 MMOL/L (ref 7–15)
ATRIAL RATE - MUSE: 117 BPM
BUN SERPL-MCNC: 13.5 MG/DL (ref 8–23)
BUN SERPL-MCNC: 14.4 MG/DL (ref 8–23)
CALCIUM SERPL-MCNC: 8.9 MG/DL (ref 8.8–10.4)
CALCIUM SERPL-MCNC: 9 MG/DL (ref 8.8–10.4)
CHLORIDE SERPL-SCNC: 103 MMOL/L (ref 98–107)
CHLORIDE SERPL-SCNC: 104 MMOL/L (ref 98–107)
CREAT SERPL-MCNC: 1 MG/DL (ref 0.51–0.95)
CREAT SERPL-MCNC: 1.08 MG/DL (ref 0.51–0.95)
DIASTOLIC BLOOD PRESSURE - MUSE: NORMAL MMHG
EGFRCR SERPLBLD CKD-EPI 2021: 48 ML/MIN/1.73M2
EGFRCR SERPLBLD CKD-EPI 2021: 53 ML/MIN/1.73M2
ERYTHROCYTE [DISTWIDTH] IN BLOOD BY AUTOMATED COUNT: 13.1 % (ref 10–15)
GLUCOSE BLDC GLUCOMTR-MCNC: 112 MG/DL (ref 70–99)
GLUCOSE BLDC GLUCOMTR-MCNC: 148 MG/DL (ref 70–99)
GLUCOSE SERPL-MCNC: 135 MG/DL (ref 70–99)
GLUCOSE SERPL-MCNC: 144 MG/DL (ref 70–99)
HCO3 SERPL-SCNC: 22 MMOL/L (ref 22–29)
HCO3 SERPL-SCNC: 23 MMOL/L (ref 22–29)
HCT VFR BLD AUTO: 45.6 % (ref 35–47)
HGB BLD-MCNC: 15.2 G/DL (ref 11.7–15.7)
INTERPRETATION ECG - MUSE: NORMAL
MAGNESIUM SERPL-MCNC: 2 MG/DL (ref 1.7–2.3)
MCH RBC QN AUTO: 31 PG (ref 26.5–33)
MCHC RBC AUTO-ENTMCNC: 33.3 G/DL (ref 31.5–36.5)
MCV RBC AUTO: 93 FL (ref 78–100)
P AXIS - MUSE: NORMAL DEGREES
PHOSPHATE SERPL-MCNC: 3.3 MG/DL (ref 2.5–4.5)
PLATELET # BLD AUTO: 274 10E3/UL (ref 150–450)
POTASSIUM SERPL-SCNC: 3.4 MMOL/L (ref 3.4–5.3)
POTASSIUM SERPL-SCNC: 3.5 MMOL/L (ref 3.4–5.3)
PR INTERVAL - MUSE: 216 MS
QRS DURATION - MUSE: 100 MS
QT - MUSE: 348 MS
QTC - MUSE: 479 MS
R AXIS - MUSE: -2 DEGREES
RBC # BLD AUTO: 4.91 10E6/UL (ref 3.8–5.2)
SODIUM SERPL-SCNC: 139 MMOL/L (ref 135–145)
SODIUM SERPL-SCNC: 139 MMOL/L (ref 135–145)
SYSTOLIC BLOOD PRESSURE - MUSE: NORMAL MMHG
T AXIS - MUSE: 127 DEGREES
VENTRICULAR RATE- MUSE: 114 BPM
WBC # BLD AUTO: 6.4 10E3/UL (ref 4–11)

## 2024-11-15 PROCEDURE — 92610 EVALUATE SWALLOWING FUNCTION: CPT | Mod: GN

## 2024-11-15 PROCEDURE — 250N000011 HC RX IP 250 OP 636

## 2024-11-15 PROCEDURE — 99223 1ST HOSP IP/OBS HIGH 75: CPT | Mod: AI

## 2024-11-15 PROCEDURE — 92523 SPEECH SOUND LANG COMPREHEN: CPT | Mod: GN

## 2024-11-15 PROCEDURE — 93005 ELECTROCARDIOGRAM TRACING: CPT

## 2024-11-15 PROCEDURE — 250N000013 HC RX MED GY IP 250 OP 250 PS 637

## 2024-11-15 PROCEDURE — 80048 BASIC METABOLIC PNL TOTAL CA: CPT

## 2024-11-15 PROCEDURE — 83735 ASSAY OF MAGNESIUM: CPT

## 2024-11-15 PROCEDURE — 93010 ELECTROCARDIOGRAM REPORT: CPT | Mod: HIP | Performed by: INTERNAL MEDICINE

## 2024-11-15 PROCEDURE — 36415 COLL VENOUS BLD VENIPUNCTURE: CPT

## 2024-11-15 PROCEDURE — 84100 ASSAY OF PHOSPHORUS: CPT

## 2024-11-15 PROCEDURE — 120N000004 HC R&B MS OVERFLOW

## 2024-11-15 PROCEDURE — 82374 ASSAY BLOOD CARBON DIOXIDE: CPT

## 2024-11-15 PROCEDURE — 250N000011 HC RX IP 250 OP 636: Mod: JW

## 2024-11-15 PROCEDURE — 85048 AUTOMATED LEUKOCYTE COUNT: CPT

## 2024-11-15 PROCEDURE — 99222 1ST HOSP IP/OBS MODERATE 55: CPT | Performed by: PSYCHIATRY & NEUROLOGY

## 2024-11-15 PROCEDURE — 85018 HEMOGLOBIN: CPT

## 2024-11-15 RX ORDER — ATORVASTATIN CALCIUM 40 MG/1
80 TABLET, FILM COATED ORAL EVERY EVENING
Status: DISCONTINUED | OUTPATIENT
Start: 2024-11-15 | End: 2024-11-20 | Stop reason: HOSPADM

## 2024-11-15 RX ORDER — BUSPIRONE HYDROCHLORIDE 10 MG/1
10 TABLET ORAL 2 TIMES DAILY
Status: DISCONTINUED | OUTPATIENT
Start: 2024-11-15 | End: 2024-11-20 | Stop reason: HOSPADM

## 2024-11-15 RX ORDER — METOPROLOL TARTRATE 25 MG/1
25 TABLET, FILM COATED ORAL 2 TIMES DAILY
Status: DISCONTINUED | OUTPATIENT
Start: 2024-11-15 | End: 2024-11-17

## 2024-11-15 RX ORDER — HYDROXYZINE HYDROCHLORIDE 10 MG/1
10 TABLET, FILM COATED ORAL 2 TIMES DAILY
Status: DISCONTINUED | OUTPATIENT
Start: 2024-11-15 | End: 2024-11-15

## 2024-11-15 RX ORDER — VENLAFAXINE HYDROCHLORIDE 150 MG/1
150 CAPSULE, EXTENDED RELEASE ORAL DAILY
Status: DISCONTINUED | OUTPATIENT
Start: 2024-11-15 | End: 2024-11-20 | Stop reason: HOSPADM

## 2024-11-15 RX ORDER — QUETIAPINE FUMARATE 25 MG/1
25 TABLET, FILM COATED ORAL
Status: COMPLETED | OUTPATIENT
Start: 2024-11-15 | End: 2024-11-15

## 2024-11-15 RX ORDER — OLANZAPINE 10 MG/2ML
5 INJECTION, POWDER, FOR SOLUTION INTRAMUSCULAR 2 TIMES DAILY PRN
Status: DISCONTINUED | OUTPATIENT
Start: 2024-11-15 | End: 2024-11-16

## 2024-11-15 RX ORDER — OLANZAPINE 10 MG/2ML
2.5 INJECTION, POWDER, FOR SOLUTION INTRAMUSCULAR ONCE
Status: COMPLETED | OUTPATIENT
Start: 2024-11-15 | End: 2024-11-15

## 2024-11-15 RX ADMIN — HYDROXYZINE HYDROCHLORIDE 10 MG: 10 TABLET ORAL at 10:47

## 2024-11-15 RX ADMIN — APIXABAN 5 MG: 5 TABLET, FILM COATED ORAL at 10:47

## 2024-11-15 RX ADMIN — APIXABAN 5 MG: 5 TABLET, FILM COATED ORAL at 20:40

## 2024-11-15 RX ADMIN — OLANZAPINE 5 MG: 10 INJECTION, POWDER, FOR SOLUTION INTRAMUSCULAR at 16:22

## 2024-11-15 RX ADMIN — OLANZAPINE 2.5 MG: 10 INJECTION, POWDER, FOR SOLUTION INTRAMUSCULAR at 01:48

## 2024-11-15 RX ADMIN — MIRTAZAPINE 30 MG: 7.5 TABLET, FILM COATED ORAL at 20:41

## 2024-11-15 RX ADMIN — LEVOTHYROXINE SODIUM 75 MCG: 0.03 TABLET ORAL at 10:48

## 2024-11-15 RX ADMIN — BUSPIRONE HYDROCHLORIDE 10 MG: 10 TABLET ORAL at 20:40

## 2024-11-15 RX ADMIN — DONEPEZIL HYDROCHLORIDE 10 MG: 5 TABLET, FILM COATED ORAL at 13:14

## 2024-11-15 RX ADMIN — Medication 5 MG/HR: at 15:04

## 2024-11-15 RX ADMIN — METOPROLOL TARTRATE 25 MG: 25 TABLET, FILM COATED ORAL at 20:40

## 2024-11-15 RX ADMIN — METOPROLOL TARTRATE 25 MG: 25 TABLET, FILM COATED ORAL at 14:03

## 2024-11-15 RX ADMIN — QUETIAPINE FUMARATE 25 MG: 25 TABLET ORAL at 19:12

## 2024-11-15 RX ADMIN — VENLAFAXINE HYDROCHLORIDE 150 MG: 150 CAPSULE, EXTENDED RELEASE ORAL at 11:04

## 2024-11-15 RX ADMIN — BUSPIRONE HYDROCHLORIDE 10 MG: 10 TABLET ORAL at 11:04

## 2024-11-15 RX ADMIN — ATORVASTATIN CALCIUM 80 MG: 40 TABLET, FILM COATED ORAL at 20:40

## 2024-11-15 ASSESSMENT — ACTIVITIES OF DAILY LIVING (ADL)
ADLS_ACUITY_SCORE: 0
ADLS_ACUITY_SCORE: 0
ADLS_ACUITY_SCORE: 22.25
ADLS_ACUITY_SCORE: 0
ADLS_ACUITY_SCORE: 0
ADLS_ACUITY_SCORE: 23.25
ADLS_ACUITY_SCORE: 0
ADLS_ACUITY_SCORE: 22.25
ADLS_ACUITY_SCORE: 0
ADLS_ACUITY_SCORE: 22.25
ADLS_ACUITY_SCORE: 23.25
ADLS_ACUITY_SCORE: 0
DEPENDENT_IADLS:: CLEANING;COOKING;LAUNDRY;SHOPPING;MEDICATION MANAGEMENT;TRANSPORTATION;MEAL PREPARATION
ADLS_ACUITY_SCORE: 23.25
ADLS_ACUITY_SCORE: 0
ADLS_ACUITY_SCORE: 0
ADLS_ACUITY_SCORE: 22.25
ADLS_ACUITY_SCORE: 0
ADLS_ACUITY_SCORE: 22.25
ADLS_ACUITY_SCORE: 0
ADLS_ACUITY_SCORE: 22.25
ADLS_ACUITY_SCORE: 22.25

## 2024-11-15 NOTE — PROGRESS NOTES
Patient Pleasantly confused & aphasic.  Still on 1:1.  Restless/fidgety and removed PIV last night.  IV has Posey sleeve covering it now.  Patient happy and smiling.  Dilt drip infusing at 5mg/ hour.  Heart rate low 100s-110s.  SBP was low 100s but recently in the 120s.  Son and Daughter (Aimee) visiting and [facility] updated them.  Another sibling coming around noon and family hopeful to have another meeting then.       0930- Speech saw.  Ok for easy to chew foods and thin liquids.  Watch for pocketing.   Neuro stopped in and spoke with son and daughter. Neuro aware of noon meeting in the works.      1030 Patient agitated late morning.  Pulling aggressively at lines and trying to get out of bed.  In report, last void was around 0400 with 2 assist to bathroom.  Attempted to assist patient to bedside commode.  She did not understand and kept moving away from the bed and pushing forward saying she had to leave.  She showed no interest in commode. 2 assist back into bed and patient still moving to get out of bed. She managed to wiggle down to end of bed and draped legs over the edge.  Bladder scanned and WNL.  Food ordered and meds given.    EKG done.  No longer in Afib. sinus tach First degree AVB     1200 Orders state to notify physician if SaO2 lower than 94%  Patient removing nasal cannula and chewing & pulling at continuous pulse ox.  89-92% on RA.  Hosapitalist notified and is ok with SaO2 at 89-92% on RA.  4 of her kids are here for conference in family room.  Patient picking at fingers.  Right indes has a cuticle that is red with old blood on it.  Held both hands and patient receptive to this.  Laying still and talking with flight of of words.  Demeanor is pleasant.  Encouraging rest.  She did not sleep last night.  Lights out and care channel on.      1445- PO metoprolol given almost 45 minutes ago.    Recent heart rate 113 and /75.  Dilt drip still infusing at 5mg/hr.  Order to wean dilt as able  after PO metoprolol.  Patient still continually picking and fidgety.  Band-aid placed on inflamed cuticle.  Family states she has always this picking habit but that habit has now increased.

## 2024-11-15 NOTE — CONSULTS
11/15 Test claim for Eliquis- Covered 100% no cost for the patient. Thank you for allowing me to help with your patient  Nevin Landeros [facility]  Pharmacy Discharge Liaison [facility]

## 2024-11-15 NOTE — PROGRESS NOTES
CLINICAL NUTRITION SERVICES - BRIEF NOTE    REASON FOR ASSESSMENT  Markus Berkowitz is a/an 89 year old female assessed by the dietitian for Admission Nutrition Risk Screen for unsure weight loss.     Met with pt, pt family at bedside this afternoon. Pt resides at [facility], meals provided BID. No decreased oral intakes or appetite. Current weight up from baseline, no weight losses.     Weight History:   Wt Readings from Last 10 Encounters:   11/14/24 96.1 kg (211 lb 13.8 oz)   6/28/23  72.6 kg (160 lb)     INTERVENTIONS  Implementation  Provided easy to chew facility menu       Monitoring/Evaluation  Will check nutrition risk in 7-10 days if remains admitted. Please consult for any nutrition interventions prior to this time if needed.

## 2024-11-15 NOTE — SIGNIFICANT EVENT
Significant Event Note    Time of event: 10:49 AM November 15, 2024    Description of event:  New agitation, pulling at lines, wanting to leave.     Patient unable to communicate clear symptoms/desires due to her stroke causing dysarthria and baseline dementia. Was in A fib earlier today but now has regular R to R intervals on telemetry, tachycardic to 110-120. Blood pressure stable at 120 systolic. Dilt drip currently running at 5 mg/hr.    Trops were negative on arrival. Patient has not urinated since yesterday around 4pm, per nursing.    Patient had her regularly scheduled mental health meds held, including mirtazepine, buspar, venlafaxine.    Able to be redirected at the bedside.     Assessment: New agitation. Likely having contributions from not having her PTA meds due to them being held. May be related to pain, but patient unable to communicate that. New regular R to Rs may be conversion to sinus tach, flutter, or other rhythm and could be contributing to agitation. Could have urinary retention contributing as well to agitation.      Plan:  Unhold PTA mental health meds - give if she will take them PO   - If unable to take PO, will have to go IM route  Family at bedside for redirection  Titrate dilt drip as needed for rate control  EKG   BMP  Bladder scan    Discussed with: patient's family/emergency contact and bedside nurse    Jose A Joseph MD        Addendum: 1:14PM  Bladder scan < 300, unlikely retaining urine. Patient calmed with redirection and restarting PTA meds. EKG indicated sinus tachycardia.   - Continue PTA meds  - Continue dilt gtt, start PO metoprolol and wean dilt as able.       Jose A Joseph MD, PGY1  [facility]

## 2024-11-15 NOTE — PROGRESS NOTES
Speech-Language Pathology:  Clinical Swallow Evaluation and Speech Language Cognitive Evaluation     11/15/24 0900   Appointment Info   Signing Clinician's Name / Credentials (SLP) María Pepper MA CCC-SLP   General Information   Onset of Illness/Injury or Date of Surgery 11/14/24   Referring Physician Dr. Joseph   Pertinent History of Current Problem Per EMR: 11/14/24 regarding patient Markus Berkowitz. The patient is a 89 year old female with a past medical history significant for dementia. History obtained from ED provider. Markus presents today from her memory care/nursing facility due to staff noting altered mental status.  On arrival to the ED she was found to be in new atrial fibrillation with RVR.  CT head revealed wedge-shaped area in the left parietal lobe concerning for late acute/early subacute infarct.  Daughter is at bedside and reports Markus is typically able to speak in complete sentences.  Daughter last saw the patient well 6 days prior to presentation.  On ED providers examination, patient is moving all extremities but speech is nonsensical and dysarthric   General Observations Alert and cooperative   Type of Evaluation   Type of Evaluation Swallow Evaluation;Speech, Language, Cognition   Oral Motor   Oral Musculature generally intact   Oral Motor Deficits Observed Facial Symmetry (Oral Motor) (Group)   Dentition (Oral Motor)   Dentition (Oral Motor) adequate dentition;some missing teeth   Facial Symmetry (Oral Motor)   Comment, Facial Symmetry (Oral Motor) WFL-no overt asymmetry with smile; slight right upper lip droop with labial sounds or pucker type movements   Lip Function (Oral Motor)   Lip Range of Motion (Oral Motor)   (WFL)   Lip Strength (Oral Motor) right side;mild impairment;WFL   Lip Coordination (Oral Motor)   (WFL)   Comment, Lip Function (Oral Motor) WFL overall   Tongue Function (Oral Motor)   Tongue ROM (Oral Motor)   (WFL- informal assessment due to receptive aphasia  limitations)   Comment, Tongue Function (Oral Motor) WFL   Facial Sensation   Facial Sensation   (appears WFL, patient unable to comprehend questions regarding this)   Vocal Quality/Secretion Management (Oral Motor)   Vocal Quality (Oral Motor) WNL   General Swallowing Observations   Past History of Dysphagia None noted in records or reported by family   Current Diet/Method of Nutritional Intake (General Swallowing Observations, NIS) NPO  (until CSE)   Swallowing Evaluation Clinical swallow evaluation   Swallowing Recommendations   Diet Consistency Recommendations thin liquids (level 0);easy to chew (level 7)   Supervision Level for Intake distant supervision needed   Mode of Delivery Recommendations slow rate of intake   Monitoring/Assistance Required (Eating/Swallowing) cue for finger/lingual sweep if oral pocketing present;monitor for cough or change in vocal quality with intake   Medication Administration Recommendations, Swallowing (SLP) per patient preference   Comment, Swallowing Recommendations No s/s aspiration. Trace to mild right upper labial droop unlikely to impact chewing or swallowing at this time   Motor Speech   Comment, Motor Speech Assessment limited verbal output beyond yes and casual social speech, but no overt dysarthria with output   Auditory Comprehension   Follows Commands (Auditory Comprehension) 1-step command  (inconsistent (less than 10%) for direct commands; patient follows context but does not appear to follow direct tasks from language alone; [place] compensated for with pockettalker)   Yes/No Questions (Auditory Comprehension)   (not reliable yes/no response; answer 'yes' or laughs or nods to all questions)   Verbal Expression   Comment, Assessment (Verbal Expression) basic social output with rare phrase level output, largely empty speech   Confrontational Naming (Verbal Expression)   (unable to state name or objects; unable to repeat her first name but identified on board)   Pragmatic  Language   Nonverbal Skills (Pragmatic Language) WFL;eye contact;reading of nonverbal cues;facial affect   Cognition   Cognitive Status Exam Comments Alert and cooperative, follows context and social cues; unable to formally assess orientation or cognition given language deficits   General Therapy Interventions   Planned Therapy Interventions Language;Communication   Language Auditory comprehension;Verbal expression   Communication Augmentative/alternative communication  (as needed)   Clinical Impression   Criteria for Skilled Therapeutic Interventions Met (SLP Eval) Yes, treatment indicated   SLP Diagnosis Dysphagia   Risks & Benefits of therapy have been explained evaluation/treatment results reviewed;care plan/treatment goals reviewed;participants included;patient   Clinical Impression Comments CSE and SL Evaluation completed. No indicators of dysphagia at this time. Recommend ETC diet as patient adapts to possible facial droop. SLP to follow for upgrade. Patient presents with moderate to severe receptive aphasia and expressive aphasia. Patient does compensate for receptive aphasia at times by following context or agreeableness/humor. She is stimulable for language output with max cues but largely empty language in context. Recommend ongoing ST for aphasia and diet upgrade when appropriate. Recommend ongoing ST at d/c.   SLP Total Evaluation Time   Eval: oral/pharyngeal swallow function, clinical swallow Minutes (36333) 15   Eval: Sound production with lang comprehension and expression Minutes (42646) 15   SLP Goals   Therapy Frequency (SLP Eval) 5 times/week   SLP Predicted Duration/Target Date for Goal Attainment 11/22/24   SLP Goals Swallow;SLP Goal 1;SLP Goal 2   SLP: Safely tolerate diet without signs/symptoms of aspiration Regular diet;Thin liquids;Independently   SLP: Goal 1 Patient will complete simple v/e tasks with 50% accuracy with max cues   SLP: Goal 2 Patient will increase simple a/c for simple  yes/no questions and simple one step commands to 50% with max cues   SLP Discharge Planning   SLP Plan Fri 0/5: basic receptive and expressive lang; diet texture upgrade if appropriate   SLP Rationale for DC Rec Ongoing ST at discharge, significant receptive and expressive aphasia   SLP Brief overview of current status  ETC and thin liquids. Moderate to severe receptive and expressive aphasia masked by agreeableness and humor.SLP to follow.   SLP Time and Intention   Total Session Time (sum of timed and untimed services) 30

## 2024-11-15 NOTE — PLAN OF CARE
Goal Outcome Evaluation:      Plan of Care Reviewed With: child          Outcome Evaluation: Goal is likely return to assisted living upon discharge. Transport TBD by mobility status.

## 2024-11-15 NOTE — PLAN OF CARE
Problem: Confusion Acute  Goal: Optimal Cognitive Function  Outcome: Not Progressing     Problem: Dysrhythmia  Goal: Normalized Cardiac Rhythm  Outcome: Not Progressing   Goal Outcome Evaluation:      Plan of Care Reviewed With: patient    Overall Patient Progress: no changeOverall Patient Progress: no change         Pt disoriented X4. Confused, unable to make full sentences. Restless all night, gave IM zyrexa - had mild effect. Pt became more restless and agitated when her daughter went home at 0330 (see previous notes).    On diltiazem drip for afib RVR. HR now 80s-110s at rest, but can be up to 150s when pt becomes agitated. Diltiazem drip currently running at 5mg/hr. On K+, Mag, phosphorus protocol. NPO.     On 1:1 for safety.

## 2024-11-15 NOTE — PROGRESS NOTES
[facility]    Medicine Progress Note - Hospitalist Service    Date of Admission:  11/14/2024    Assessment & Plan   Markus Berkowitz is a 89 year old female admitted on 11/14/2024. She has a history of dementia, hypothyroidism, HTN, CKD3a, depression, and anxiety and is admitted for acute ischemic stroke of left parietal lobe likely due to cardio embolism in the setting of new onset atrial fibrillation with rapid ventricular response.      Acute Ischemic Left MCA stroke  Dysarthria  Came in with AMS. Nonsensical speech and dysarthria. CT Head showed Wedge-shaped focus of loss of gray-white matter differentiation left parietal lobe measuring 3.1 x 3 cm, concerning for an late acute/early subacute infarct. CTA notable for distal left M2 occlusion and 90% stenosis of left ICA. MRI w/ left MCA occlusion evidence. Suspect due to cardio embolism from atrial fibrillation vs plaque embolization from internal carotid. A1C 6.0. , total cholesterol 366.  Neurology consulted, appreciate recs  - Neurochecks and Vital Signs every 4 hours   - Permissive HTN; goal SBP < 220 mmHg   (Will need some rate control for a fib, see below, which will slightly lower pressures)  - Atorvastatin 80mg daily  - Eliquis  - TTE (will be done tomorrow, patient agitated and echo tech deferred exam)   - May need premedication for agitation, see below  - Telemetry  - PT/OT/SLP  - Stroke Education  - Euthermia, Euglycemia     Atrial fibrillation with RVR  Intermittent conversions to sinus tachycardia  Atrial fibrillation with RVR upon arrival to the ED on telemetry. HR 130s. EKG confirmed. No ischemic changes, no prior to compare to. No known history of A-fib or heart failure. Initially, rate controlled in the ED with diltiazem, then placed on diltiazem gtt for persistent RVR. During the day 11/15, patient spontaneously converted to sinus tachycardia w/ rates 110-120, confirmed with EKG.  - Eliquis  - Metoprolol BID,  "stop dilt drip   - HOLD if HR < 100  - TTE as above    - Consult cards if needed, after results return  - Telemetry    Hypothyroidism   Patient has hx of hypothyroidism. PTA 75 mcg levothyroxine daily. TSH on admission elevated to 8.45. T4 0.73. Could consider increasing levothyroxine dose prior to discharge.   - PTA levothyroxine 75 mcg     Intermittent Agitation  Dementia   Anxiety/Depression  - PTA donepezil, hydroxyzine, venlafaxine, mirtazapine  - Delirium precautions  - Redirect first if able, can use zyprexa 5mg BID PRN if needed   - Consider premedication with this for TTE if needed     CKD3a  MARIA ELENA   Patient's Cr 1.2 on admission, up from baseline of 1.1. Likely prerenal. S/p 500 mL bolus in ED.   - strict I&Os  - avoid nephrotoxic medications     HTN:   - PTA amlodipine 10 mg daily. Currently allowing permissive HTN, goal SBP < 220.     Goals of Care  Had care conference today. Proceed with DNR/DNI per POLST/Advanced directive. Proceed with full cares of her conditions, son Eduardo is POA.           Diet: Combination Diet Easy to Chew (level 7); Thin Liquids (level 0); Thin Liquids (level 0)    DVT Prophylaxis: Enoxaparin (Lovenox) SQ  Griffiths Catheter: Not present  Lines: None     Cardiac Monitoring: ACTIVE order. Indication: Stroke, acute (48 hours)  Code Status: No CPR- Do NOT Intubate      Clinically Significant Risk Factors                       # Acute Hypoxic Respiratory Failure: Documented O2 saturation < 90%. Continue supplemental oxygen as needed         # Obesity: Estimated body mass index is 38.75 kg/m  as calculated from the following:    Height as of this encounter: 1.575 m (5' 2\").    Weight as of this encounter: 96.1 kg (211 lb 13.8 oz)., PRESENT ON ADMISSION     # Financial/Environmental Concerns: none         Social Drivers of Health            Disposition Plan     Medically Ready for Discharge: Anticipated in 2-4 Days       The patient's care was discussed with the Attending Physician, . " Oj .    Jose A Joseph MD  Hospitalist Service  [facility]  Securely message with [employer] (more info)  Text page via [employer] Paging/Directory   ______________________________________________________________________    Interval History   Intermittently agitated, required zyprexa overnight. Had PTA psych meds held due to concern for somnolence overnight. Dysarthria persists. No complaints. Intermittently states she wants to leave and then is redirectable.     Physical Exam   Vital Signs: Temp: 98.1  F (36.7  C) Temp src: Oral BP: 115/84 Pulse: 112   Resp: 18 SpO2: 92 % O2 Device: None (Room air) Oxygen Delivery: 4 LPM  Weight: 211 lbs 13.79 oz    General: Intermittently agitated, otherwise calm.   Skin: Warm, dry, intact no rashes or lesions.  HEENT: Normocephalic, atraumatic. Sclera non-icteric. EOMI.  Cardiac: RRR without murmurs, gallops. Normal S1 and S2.  Respiratory: Normal work of breathing. Clear to auscultation bilaterally.  Abdominal: Soft, non-tender without distention.  Extremities: No edema. Atraumatic.  Neurological: Awake, oriented to self, one word answers with occasional sentences that don't correlate with our attempted conversation, substituted words, no slurred speech, moves all extremities unable to fully assess strength/sensation due to receptive aphasia. No facial droop. Unable to follow simple commands.    Medical Decision Making             Data

## 2024-11-15 NOTE — PROGRESS NOTES
Pt daughter was at bedside till about 0330 this morning. When pt daughter left, pt was on 1:1.     Pt became more restless and agitated. Insisted on going to use the bathroom. Staff reminded pt that she has purewick on, but pt was so confused and adamant on going to the bathroom. Pulled out IV.     Pt assisted to the bathroom with assist of 2. Pt became very SOB and sweaty after using the bathroom. Pt put back to bed and vitals was checked. BP 73/52, 87/53.     Diltiazem drip paused. Dr. Vila updated. Will recheck BP in 10-15 minutes. BP recheck stable- 126/90, 116/57. Diltiazem drip restarted at 5mg/hr

## 2024-11-15 NOTE — SIGNIFICANT EVENT
1630- Patient request bathroom and was assisted up to bedside commode with 2 assist.  She was incontinent of stool on the way and became very agitated (hitting, resistive to redirection and angry demeanor)  She attempted to walk towards door and staff followed with commode while maintaining safety so she would not fall.  5 staff assist to get patient back to bed.  Hospitlaist notified and PRN zyprexa ordered.  Patient still squirming about in bed and trying to put legs over foot siderail.  PRN Zyprexa given.   Diltiazem was disconnected during this time so it could not be used as a weapon.  Echo was here at same time and was unable to perform procedure.    1730- Patient still having periods of pulling at lines but has increased periods of rest.      1800- Patient becoming more agitated.  Laying sideway on bed and trying to get legs over side rail and pushing feet through hole in rail.  Pillows placed to protect as much as possible and House officer paged.  Patients dinner tray is here and she is refusing.  Food was received well at lunch but she is refusing to eat now. Bladder scanned for 231.  Question if urine was mixed in with loose stool earlier    1900- one time dose of Seroquel in ice cream given.

## 2024-11-15 NOTE — CONSULTS
Care Management Initial Consult    General Information  Assessment completed with: Family, Patient, Patient's daughterAimee  Type of CM/SW Visit: Initial Assessment    Primary Care Provider verified and updated as needed: Yes   Readmission within the last 30 days: no previous admission in last 30 days      Reason for Consult: discharge planning  Advance Care Planning: Advance Care Planning Reviewed: other (see comments) (Patient's daughterAimee, verified patietnt has a HCD)        Communication Assessment  Patient's communication style: spoken language (English or Bilingual)    Hearing Difficulty or Deaf: yes   Wear Glasses or Blind: yes    Cognitive  Cognitive/Neuro/Behavioral: .WDL except, orientation  Level of Consciousness: confused  Arousal Level: opens eyes spontaneously  Orientation: disoriented x 4  Mood/Behavior: restless, agitated  Best Language: 1 - Mild to moderate  Speech: other (see comments), illogical (unable to omplete and give full sentences)    Living Environment:   People in home: alone     Current living Arrangements: apartment, other (see comments) ([facility])  Name of Facility: [facility]   Able to return to prior arrangements: yes     Family/Social Support:  Care provided by: self, other (see comments) ([facility] staffs)  Provides care for: no one, unable/limited ability to care for self  Marital Status: Single  Support system: Children          Description of Support System: Supportive, Involved    Support Assessment: Adequate family and caregiver support    Current Resources:   Patient receiving home care services: No     Community Resources: None  Equipment currently used at home: cane, straight  Supplies currently used at home: None    Employment/Financial:  Employment Status: retired        Financial Concerns: none   Referral to Financial Worker: No     Does the patient's insurance plan have a 3 day qualifying hospital  stay waiver?  No    Lifestyle & Psychosocial Needs:  Social Drivers of Health     Food Insecurity: Not on file   Depression: Not on file   Housing Stability: Not on file   Tobacco Use: Not on file   Financial Resource Strain: Not on file   Alcohol Use: Not on file   Transportation Needs: Not on file   Physical Activity: Not on file   Interpersonal Safety: Not on file   Stress: Not on file   Social Connections: Not on file   Health Literacy: Not on file     Functional Status:  Prior to admission patient needed assistance:   Dependent ADLs:: Bathing  Dependent IADLs:: Cleaning, Cooking, Laundry, Shopping, Medication Management, Transportation, Meal Preparation     Discussed  Partnership in Safe Discharge Planning  document with patient/family: Yes: patient's daughterAimee    Additional Information:  Writer met with patient, her daughterAimee and another family member at bedside to review role of care management services, discuss goals of care and assess need for any possible services at discharge. Patient alert. Aimee provided most of the answers to the assessment.  Address, phone number and PCP confirmed. Aimee reported patient has a HCD. Patient lives alone in an apartment at [facility]. Patient needs assist with ADLs and is dependent with IADLs. Walk with no assistive device. Has cane. Has [facility] worker, Valerie, with [facility] [phone]. Goal is to return to the [facility]. Transport TBD.     Rec'd a call from Valerie rollins/[facility] regarding patient. Patient has a Elderly [facility]+ CW. Wants update no discharge plans    Return call and left a message for Valerie to return call    Next Steps: RNCM to follow for medical progression, recommendations, and final discharge plan.     Robyn Lockett RN

## 2024-11-15 NOTE — PROGRESS NOTES
"Care Progression Meeting:     Staff present: Attending MD Dr Joseph, Faculty MD Dr. Mcwilliams, Esthela Mcmillan, SERGE CM  Family present/on phone or I-PAD:  Present; son Eduardo, daughter Aimee, son Jcarlos, daughter Katherin     MD goals  of care and/or barriers to discharge:  Discussed that patient has had a stroke and the team (attending, Neurologist and therapists following) will working on discovering the cause of the stroke and a treatment plan in coordination with family wishes.      PT Goals and progress: Pending, not yet evaluated.     OT Goals and progress:  Pending, not yet evaluated.      ST Goals and progress:  CSE and SL Evaluation completed. No indicators of dysphagia at this time. Recommend ETC diet as patient adapts to possible facial droop. SLP to follow for upgrade. Patient presents with moderate to severe receptive aphasia and expressive aphasia. Patient does compensate for receptive aphasia at times by following context or agreeableness/humor. She is stimulable for language output with max cues but largely empty language in context. Recommend ongoing ST for aphasia and diet upgrade when appropriate. Recommend ongoing ST at d/c.      RD Goals and progress:  Pending, not yet evaluated     Care management Goals and progression of discharge planning, barriers to discharge planning:  Patient is a resident of [facility] in [place] where she is independent with mobility (she has a cane and a walker and is supposed to use them but does not). She usually goes to meals (lunch and dinner) and enjoys going out to eat with her family. Otherwise, she is not generally social and usually \"sits in her room and watches TV\".   Per Aimee, patient was diagnosed with moderate dementia about 3-4 years ago but has done very well but lately they have been noting a sharp decline lately. The family all notes that patient is 'fiercly independent and very stubborn' and would not likely do well with therapy participation. Aimee " plans to call [facility] to discuss a move to their memory care portion.   A copy of patient's advance directives has been sent to [facility] for validation but family requests that staff work primarily with Aimee for discharge planning.   Therapy evaluations of mobility are pending.   Writer spoke with nurse Hipolito at [facility]. Per Hipolito, the assisted living provides assistance with patient's medications but can provide assist of 1-2 and even a sit to stand or mechanical lift depending on patient's needs. He spoke with the  who stated that they currently have two openings in their memory care, potentially three. Update provided to Aimee.     Estimated date of discharge:  Monday November 18th.     Esthela Mcmillan RN     Abbreviation  Code:  [facility] ([facility] STR), Patient (pt), Transitional Care Unit (TCU), Skilled Nursing Facility (SNF), Long Term Care (LTC), Long term acute care hospital (LTACH), Assisted Living (LILIYA or AL), Care Management (CM), Physical Therapy (PT), Occupational Therapy (OT), Speech Therapy (ST), Registered Dietician (RD), Respiratory Therapy (RT).

## 2024-11-15 NOTE — PROGRESS NOTES
Pt remains in Afib RVR, HR 120s to 140s. Diltiazem drip titrated to 10ml/hr. Dr. Andersen updated.

## 2024-11-16 ENCOUNTER — APPOINTMENT (OUTPATIENT)
Dept: CARDIOLOGY | Facility: HOSPITAL | Age: 89
End: 2024-11-16
Payer: MEDICARE

## 2024-11-16 ENCOUNTER — APPOINTMENT (OUTPATIENT)
Dept: SPEECH THERAPY | Facility: HOSPITAL | Age: 89
End: 2024-11-16
Payer: MEDICARE

## 2024-11-16 VITALS
SYSTOLIC BLOOD PRESSURE: 135 MMHG | WEIGHT: 205.25 LBS | HEIGHT: 62 IN | BODY MASS INDEX: 37.77 KG/M2 | TEMPERATURE: 97.7 F | OXYGEN SATURATION: 90 % | HEART RATE: 118 BPM | RESPIRATION RATE: 20 BRPM | DIASTOLIC BLOOD PRESSURE: 70 MMHG

## 2024-11-16 LAB
ANION GAP SERPL CALCULATED.3IONS-SCNC: 10 MMOL/L (ref 7–15)
BUN SERPL-MCNC: 19.6 MG/DL (ref 8–23)
CALCIUM SERPL-MCNC: 9 MG/DL (ref 8.8–10.4)
CHLORIDE SERPL-SCNC: 107 MMOL/L (ref 98–107)
CREAT SERPL-MCNC: 1.08 MG/DL (ref 0.51–0.95)
EGFRCR SERPLBLD CKD-EPI 2021: 48 ML/MIN/1.73M2
ERYTHROCYTE [DISTWIDTH] IN BLOOD BY AUTOMATED COUNT: 13.2 % (ref 10–15)
GLUCOSE SERPL-MCNC: 100 MG/DL (ref 70–99)
HCO3 SERPL-SCNC: 23 MMOL/L (ref 22–29)
HCT VFR BLD AUTO: 44.5 % (ref 35–47)
HGB BLD-MCNC: 15.2 G/DL (ref 11.7–15.7)
LVEF ECHO: NORMAL
MCH RBC QN AUTO: 31.9 PG (ref 26.5–33)
MCHC RBC AUTO-ENTMCNC: 34.2 G/DL (ref 31.5–36.5)
MCV RBC AUTO: 93 FL (ref 78–100)
PLATELET # BLD AUTO: 238 10E3/UL (ref 150–450)
POTASSIUM SERPL-SCNC: 3.5 MMOL/L (ref 3.4–5.3)
RBC # BLD AUTO: 4.77 10E6/UL (ref 3.8–5.2)
SODIUM SERPL-SCNC: 140 MMOL/L (ref 135–145)
WBC # BLD AUTO: 5.3 10E3/UL (ref 4–11)

## 2024-11-16 PROCEDURE — 250N000011 HC RX IP 250 OP 636

## 2024-11-16 PROCEDURE — 85041 AUTOMATED RBC COUNT: CPT

## 2024-11-16 PROCEDURE — 99232 SBSQ HOSP IP/OBS MODERATE 35: CPT | Mod: GC

## 2024-11-16 PROCEDURE — 250N000009 HC RX 250

## 2024-11-16 PROCEDURE — 93306 TTE W/DOPPLER COMPLETE: CPT

## 2024-11-16 PROCEDURE — 93306 TTE W/DOPPLER COMPLETE: CPT | Mod: 26 | Performed by: INTERNAL MEDICINE

## 2024-11-16 PROCEDURE — 250N000013 HC RX MED GY IP 250 OP 250 PS 637

## 2024-11-16 PROCEDURE — 80048 BASIC METABOLIC PNL TOTAL CA: CPT

## 2024-11-16 PROCEDURE — 85014 HEMATOCRIT: CPT

## 2024-11-16 PROCEDURE — 92507 TX SP LANG VOICE COMM INDIV: CPT | Mod: GN | Performed by: SPEECH-LANGUAGE PATHOLOGIST

## 2024-11-16 PROCEDURE — 120N000004 HC R&B MS OVERFLOW

## 2024-11-16 PROCEDURE — 82374 ASSAY BLOOD CARBON DIOXIDE: CPT

## 2024-11-16 PROCEDURE — 36415 COLL VENOUS BLD VENIPUNCTURE: CPT

## 2024-11-16 RX ORDER — OLANZAPINE 10 MG/2ML
2.5 INJECTION, POWDER, FOR SOLUTION INTRAMUSCULAR
Status: COMPLETED | OUTPATIENT
Start: 2024-11-16 | End: 2024-11-16

## 2024-11-16 RX ORDER — METOPROLOL TARTRATE 1 MG/ML
5 INJECTION, SOLUTION INTRAVENOUS EVERY 6 HOURS PRN
Status: DISCONTINUED | OUTPATIENT
Start: 2024-11-16 | End: 2024-11-18

## 2024-11-16 RX ADMIN — APIXABAN 5 MG: 5 TABLET, FILM COATED ORAL at 13:48

## 2024-11-16 RX ADMIN — METOPROLOL TARTRATE 5 MG: 5 INJECTION INTRAVENOUS at 10:52

## 2024-11-16 RX ADMIN — VENLAFAXINE HYDROCHLORIDE 150 MG: 150 CAPSULE, EXTENDED RELEASE ORAL at 13:48

## 2024-11-16 RX ADMIN — APIXABAN 5 MG: 5 TABLET, FILM COATED ORAL at 20:20

## 2024-11-16 RX ADMIN — METOPROLOL TARTRATE 5 MG: 5 INJECTION INTRAVENOUS at 17:02

## 2024-11-16 RX ADMIN — BUSPIRONE HYDROCHLORIDE 10 MG: 10 TABLET ORAL at 13:48

## 2024-11-16 RX ADMIN — OLANZAPINE 2.5 MG: 10 INJECTION, POWDER, FOR SOLUTION INTRAMUSCULAR at 17:38

## 2024-11-16 RX ADMIN — BUSPIRONE HYDROCHLORIDE 10 MG: 10 TABLET ORAL at 20:20

## 2024-11-16 RX ADMIN — DONEPEZIL HYDROCHLORIDE 10 MG: 5 TABLET, FILM COATED ORAL at 13:49

## 2024-11-16 RX ADMIN — MIRTAZAPINE 30 MG: 7.5 TABLET, FILM COATED ORAL at 20:20

## 2024-11-16 RX ADMIN — ATORVASTATIN CALCIUM 80 MG: 40 TABLET, FILM COATED ORAL at 20:20

## 2024-11-16 RX ADMIN — METOPROLOL TARTRATE 25 MG: 25 TABLET, FILM COATED ORAL at 13:45

## 2024-11-16 RX ADMIN — METOPROLOL TARTRATE 25 MG: 25 TABLET, FILM COATED ORAL at 19:31

## 2024-11-16 ASSESSMENT — ACTIVITIES OF DAILY LIVING (ADL)
ADLS_ACUITY_SCORE: 23.25
ADLS_ACUITY_SCORE: 0
ADLS_ACUITY_SCORE: 23.25
ADLS_ACUITY_SCORE: 0
ADLS_ACUITY_SCORE: 23.25
ADLS_ACUITY_SCORE: 0
ADLS_ACUITY_SCORE: 23.25
ADLS_ACUITY_SCORE: 0
ADLS_ACUITY_SCORE: 23.25

## 2024-11-16 NOTE — SIGNIFICANT EVENT
Significant Event Note    Time of event: 5:35 PM November 16, 2024    Description of event:  Notified by RN that patient became very agitated while in the bathroom and refusing help from nursing staff to bring her back to bed. Due to risk for falling in the bathroom in her agitated state, will give Zyprexa 2.5 mg IM once.     Discussed with: bedside nurse    Destinee Shi MD

## 2024-11-16 NOTE — PROGRESS NOTES
"Patient refusing PO meds and ice cream.  She is restful except times when cares are trying to be provided.  When ice cream brought to her mouth, she swatted spoon away and said \"No, I don't want any\".  She continued to do this when approached again on 3 different occasions.  Hopsitalist notified of medication refusal.  Heart rate in the 110s.  Sinus tach  "

## 2024-11-16 NOTE — PLAN OF CARE
"  Problem: Dysrhythmia  Goal: Normalized Cardiac Rhythm  Outcome: Progressing  Intervention: Monitor and Manage Cardiac Rhythm Effect  Recent Flowsheet Documentation  Taken 11/16/2024 0103 by Jeancarlos Alegria RN  VTE Prevention/Management: (lines minimized for safety. patient moves in bed frequently) SCDs off (sequential compression devices)     Problem: Confusion Acute  Goal: Optimal Cognitive Function  Outcome: Progressing  Intervention: Minimize Contributing Factors  Recent Flowsheet Documentation  Taken 11/16/2024 0103 by Jeancarlos Alegria RN  Sensory Stimulation Regulation:   care clustered   lighting decreased   music on  Reorientation Measures: reorientation provided  Communication Support Strategies: one-step directions provided           Goal Outcome Evaluation:  1-1 sitter at bedside for safety. Cares clustered at night. Patient slept most of the shift. Patient is confused, but arouses spontaneously. Says \"no\", \"yes\", and \"thanks.\" Has not expressed full sentences. Writer unable to do full NIHSS given receptive and expressive aphasia. Patient able to move all extremities. Weight shifting frequently in bed. VSS. On room air. PAINAD score 0. Patient's occasionally restless and defensive during cares. Redirected and reassured. No prn given overnight. Patient is assist x2-3. Incontinent of bladder. Last stool on 11/15, passing flatus. Offering oral fluids when awake, but patient declined. On aspiration precaution.    Tele: SR/sinus tachycardia with occasional pvcs. Hr 90s-110s.    "

## 2024-11-16 NOTE — PROGRESS NOTES
[facility]    Medicine Progress Note - Hospitalist Service    Date of Admission:  11/14/2024    Assessment & Plan   Markus Berkowitz is a 89 year old female admitted on 11/14/2024. She has a history of dementia, hypothyroidism, HTN, CKD3a, depression, and anxiety and is admitted for acute ischemic stroke of left parietal lobe likely due to cardio embolism in the setting of new onset atrial fibrillation with rapid ventricular response.      Acute Ischemic Left MCA stroke  Dysarthria  Came in with AMS. Nonsensical speech and dysarthria. CT Head showed Wedge-shaped focus of loss of gray-white matter differentiation left parietal lobe measuring 3.1 x 3 cm, concerning for an late acute/early subacute infarct. CTA notable for distal left M2 occlusion and 90% stenosis of left ICA. MRI w/ left MCA occlusion evidence. Suspect due to cardio embolism from atrial fibrillation vs plaque embolization from internal carotid. A1C 6.0. , total cholesterol 366.  Neurology consulted, appreciate recs  - Neurochecks and Vital Signs every 4 hours   - Permissive HTN; goal SBP < 220 mmHg   (Will need some rate control for a fib, see below, which will slightly lower pressures)  - Atorvastatin 80mg daily  - Eliquis  - Telemetry  - PT/OT/SLP  - Stroke Education  - Euthermia, Euglycemia     Atrial fibrillation with RVR  Intermittent conversions to sinus tachycardia  Atrial fibrillation with RVR upon arrival to the ED on telemetry. HR 130s. EKG confirmed. No ischemic changes, no prior to compare to. No known history of A-fib or heart failure. Initially, rate controlled in the ED with diltiazem, then placed on diltiazem gtt for persistent RVR. During the day 11/15, patient spontaneously converted to sinus tachycardia w/ rates 110-120, confirmed with EKG. Echo with mild to moderate dilation of left atrium and mild decrease in right ventricular systolic function.   - Eliquis  - Metoprolol BID   - IV metoprolol 5  "mg q6h PRN for HR > 110 (as patient refused PO medications this morning)  - TTE as above    - Consult cards if needed, after results return  - Telemetry    Hypothyroidism   Patient has hx of hypothyroidism. PTA 75 mcg levothyroxine daily. TSH on admission elevated to 8.45. T4 0.73. Could consider increasing levothyroxine dose prior to discharge.   - PTA levothyroxine 75 mcg     Intermittent Agitation  Dementia   Anxiety/Depression  - PTA donepezil, hydroxyzine, venlafaxine, mirtazapine  - Delirium precautions  - Redirect first if able, can use zyprexa 5mg BID PRN if needed   - Consider premedication with this for TTE if needed     CKD3a  MARIA ELENA   Patient's Cr 1.2 on admission, up from baseline of 1.1. Likely prerenal. S/p 500 mL bolus in ED.   - strict I&Os  - avoid nephrotoxic medications     HTN:   - hold PTA amlodipine 10 mg daily. Currently allowing permissive HTN, goal SBP < 220.     Goals of Care  Had care conference today. Proceed with DNR/DNI per POLST/Advanced directive.  On 11/16, attending Dr. Oconnell and discussed possibility of carotid endarterectomy with family.  Family is clear that this type of surgery would not be within patient's goals of care given her current status.  No surgery but otherwise full medical cares.  Son Eduardo is POA.           Diet: Combination Diet Easy to Chew (level 7); Thin Liquids (level 0); Thin Liquids (level 0)    DVT Prophylaxis: Enoxaparin (Lovenox) SQ  Griffiths Catheter: Not present  Lines: None     Cardiac Monitoring: ACTIVE order. Indication: Stroke, acute (48 hours)  Code Status: No CPR- Do NOT Intubate      Clinically Significant Risk Factors                              # Obesity: Estimated body mass index is 37.54 kg/m  as calculated from the following:    Height as of this encounter: 1.575 m (5' 2\").    Weight as of this encounter: 93.1 kg (205 lb 4 oz)., PRESENT ON ADMISSION     # Financial/Environmental Concerns: none         Social Drivers of Health    Interpersonal " Safety: Unknown (11/16/2024)    Interpersonal Safety     Do you feel physically and emotionally safe where you currently live?: Patient unable to answer     Within the past 12 months, have you been hit, slapped, kicked or otherwise physically hurt by someone?: Patient unable to answer     Within the past 12 months, have you been humiliated or emotionally abused in other ways by your partner or ex-partner?: Patient unable to answer          Disposition Plan     Medically Ready for Discharge: Anticipated in 2-4 Days       The patient's care was discussed with the Attending Physician, Dr. Brooks .    Destinee Shi MD  Hospitalist Service  [facility]  Securely message with [employer] (more info)  Text page via [facility] Paging/Directory   ______________________________________________________________________    Interval History   Patient resting calmly this morning. Very resistant to cares when awoken. RN unable to give PO medications due to patient swatting them away and spitting them out. Given elevated HR, gave dose of IV metoprolol 5 mg.     Attending physician Dr. Brooks had a long conversation with patient's family today about consideration of carotid endarterectomy.  Discussed potential benefits and high risks of this procedure.  Family in agreement that this type of surgery would not be within patient's goals of care given her current state.    Physical Exam   Vital Signs: Temp: 98.2  F (36.8  C) Temp src: Axillary BP: 131/82 Pulse: 109   Resp: 18 SpO2: 94 % O2 Device: None (Room air)    Weight: 205 lbs 3.97 oz    General: Intermittently agitated, otherwise calm.   Skin: Warm, dry, intact no rashes or lesions.  HEENT: Normocephalic, atraumatic. Sclera non-icteric. EOMI.  Cardiac: RRR without murmurs, gallops. Normal S1 and S2.  Respiratory: Normal work of breathing. Clear to auscultation bilaterally.  Abdominal: Soft, non-tender without distention.  Extremities: No edema.  Atraumatic.  Neurological: Sleeping comfortably on my arrival.  Awakens to voice.  When awake, fidgety and resisting cares.    Medical Decision Making             Data

## 2024-11-16 NOTE — PLAN OF CARE
Problem: Adult Inpatient Plan of Care  Goal: Plan of Care Review  Description: The Plan of Care Review/Shift note should be completed every shift.  The Outcome Evaluation is a brief statement about your assessment that the patient is improving, declining, or no change.  This information will be displayed automatically on your shift  note.  Outcome: Progressing     Problem: Confusion Acute  Goal: Optimal Cognitive Function  Outcome: Progressing  Intervention: Minimize Contributing Factors  Recent Flowsheet Documentation  Taken 11/16/2024 1330 by Juana Astudillo RN  Sensory Stimulation Regulation: music on  Reorientation Measures: reorientation provided  Communication Support Strategies: one-step directions provided  Taken 11/16/2024 0800 by Juana Astudillo RN  Sensory Stimulation Regulation: music on  Reorientation Measures: reorientation provided  Communication Support Strategies: one-step directions provided     Problem: Skin Injury Risk Increased  Goal: Skin Health and Integrity  Outcome: Progressing  Intervention: Plan: Nurse Driven Intervention: Moisture Management  Recent Flowsheet Documentation  Taken 11/16/2024 1330 by Juana Astudillo RN  Moisture Interventions: Incontinence pad  Taken 11/16/2024 0800 by Juana Astudillo RN  Moisture Interventions: Incontinence pad  Intervention: Plan: Nurse Driven Intervention: Friction and Shear  Recent Flowsheet Documentation  Taken 11/16/2024 1330 by Juana Astudillo RN  Friction/Shear Interventions: HOB 30 degrees or less  Taken 11/16/2024 0800 by Juana Astudillo RN  Friction/Shear Interventions: HOB 30 degrees or less  Intervention: Optimize Skin Protection  Recent Flowsheet Documentation  Taken 11/16/2024 1330 by Juana Astudillo RN  Activity Management: activity adjusted per tolerance  Head of Bed (HOB) Positioning: HOB at 20-30 degrees  Taken 11/16/2024 0800 by Juana Astudillo RN  Activity Management: activity adjusted per tolerance  Head of  Bed (HOB) Positioning: HOB at 20-30 degrees     Problem: Stroke, Ischemic (Includes Transient Ischemic Attack)  Goal: Optimal Coping  Outcome: Progressing  Goal: Improved Communication Skills  Outcome: Progressing  Goal: Optimal Nutrition Intake  Outcome: Progressing   Goal Outcome Evaluation:         Patient was resistive to cares this morning but more receptive this afternoon.  She was sleepy this morning and awake this afternoon.  She refused AM meds including metoprolol.  IV dose was ordered and given.  This afternoon, she took her AM meds at 1:45 (including AM dose of metoprolol).  Heart rate in the 110s range.  PRN IV metoprolol every 6 hours for heart rate >110 (able to have next dose at 1652).  Tele fluctuates between sinus tachy and a-fib/a-fib RVR.  Son Eduardo and his spouse at bedside since late morning.  They will put call light on when they leave.  Patient cooperative and not agitated this afternoon with exception of urgent need for bathroom.  Two staff assist and patient continent of soft/loose stool and urine.  She followed simple commands (blink and squeeze hand) and is putting more words together (yes,I like that, No,I don't want that). compared to yesterday.  She is not able to call objects by name but understands the function of TV remote and was working at finding right channel  Son is bringing her glasses in so she can see the TV better.  Family states Patient is avid reader.  Family brought in magazines but she has not shown interest in these.  Pills were put in her hand and she put them in her mouth after minimal direction.

## 2024-11-16 NOTE — PLAN OF CARE
"  Problem: Adult Inpatient Plan of Care  Goal: Absence of Hospital-Acquired Illness or Injury  Outcome: Met  Intervention: Identify and Manage Fall Risk  Recent Flowsheet Documentation  Taken 11/15/2024 2000 by Cinthya Diaz RN  Safety Promotion/Fall Prevention: bedside attendant  Intervention: Prevent Skin Injury  Recent Flowsheet Documentation  Taken 11/15/2024 2000 by Cinthya Diaz RN  Body Position: position changed independently  Intervention: Prevent and Manage VTE (Venous Thromboembolism) Risk  Recent Flowsheet Documentation  Taken 11/15/2024 2000 by Cinthya Diaz RN  VTE Prevention/Management: (minimizing lines, patient moving around in bed frequently) SCDs off (sequential compression devices)  Goal: Optimal Comfort and Wellbeing  Outcome: Met  Intervention: Provide Person-Centered Care  Recent Flowsheet Documentation  Taken 11/15/2024 2000 by Cinthya Diaz RN  Trust Relationship/Rapport:   care explained   emotional support provided   reassurance provided   Goal Outcome Evaluation:       Pt. On 1:1 for safety, agitation, impulsive. Took meds crushed in ice cream. Now calm and resting. Telemetry sinus tachycardia. Was incontinent of urine. Brief on. Assist 2 ambulation. VSS Continue with current plan of care.    /72 (BP Location: Left arm)   Pulse 111   Temp 98  F (36.7  C) (Oral)   Resp 18   Ht 1.575 m (5' 2\")   Wt 96.1 kg (211 lb 13.8 oz)   SpO2 92%   BMI 38.75 kg/m      Cinthya Diaz RN                   "

## 2024-11-17 ENCOUNTER — APPOINTMENT (OUTPATIENT)
Dept: OCCUPATIONAL THERAPY | Facility: HOSPITAL | Age: 89
End: 2024-11-17
Payer: MEDICARE

## 2024-11-17 ENCOUNTER — APPOINTMENT (OUTPATIENT)
Dept: PHYSICAL THERAPY | Facility: HOSPITAL | Age: 89
End: 2024-11-17
Payer: MEDICARE

## 2024-11-17 LAB
ANION GAP SERPL CALCULATED.3IONS-SCNC: 9 MMOL/L (ref 7–15)
BUN SERPL-MCNC: 19.7 MG/DL (ref 8–23)
CALCIUM SERPL-MCNC: 8.9 MG/DL (ref 8.8–10.4)
CHLORIDE SERPL-SCNC: 105 MMOL/L (ref 98–107)
CREAT SERPL-MCNC: 1.06 MG/DL (ref 0.51–0.95)
EGFRCR SERPLBLD CKD-EPI 2021: 49 ML/MIN/1.73M2
ERYTHROCYTE [DISTWIDTH] IN BLOOD BY AUTOMATED COUNT: 13.2 % (ref 10–15)
GLUCOSE SERPL-MCNC: 107 MG/DL (ref 70–99)
HCO3 SERPL-SCNC: 24 MMOL/L (ref 22–29)
HCT VFR BLD AUTO: 44.8 % (ref 35–47)
HGB BLD-MCNC: 14.8 G/DL (ref 11.7–15.7)
MAGNESIUM SERPL-MCNC: 2.1 MG/DL (ref 1.7–2.3)
MCH RBC QN AUTO: 31 PG (ref 26.5–33)
MCHC RBC AUTO-ENTMCNC: 33 G/DL (ref 31.5–36.5)
MCV RBC AUTO: 94 FL (ref 78–100)
PHOSPHATE SERPL-MCNC: 3.7 MG/DL (ref 2.5–4.5)
PLATELET # BLD AUTO: 251 10E3/UL (ref 150–450)
POTASSIUM SERPL-SCNC: 3.6 MMOL/L (ref 3.4–5.3)
RBC # BLD AUTO: 4.77 10E6/UL (ref 3.8–5.2)
SODIUM SERPL-SCNC: 138 MMOL/L (ref 135–145)
WBC # BLD AUTO: 6.4 10E3/UL (ref 4–11)

## 2024-11-17 PROCEDURE — 120N000004 HC R&B MS OVERFLOW

## 2024-11-17 PROCEDURE — 97116 GAIT TRAINING THERAPY: CPT | Mod: GP

## 2024-11-17 PROCEDURE — 250N000013 HC RX MED GY IP 250 OP 250 PS 637

## 2024-11-17 PROCEDURE — 85018 HEMOGLOBIN: CPT

## 2024-11-17 PROCEDURE — 97535 SELF CARE MNGMENT TRAINING: CPT | Mod: GO

## 2024-11-17 PROCEDURE — 99232 SBSQ HOSP IP/OBS MODERATE 35: CPT | Mod: GC

## 2024-11-17 PROCEDURE — 80048 BASIC METABOLIC PNL TOTAL CA: CPT

## 2024-11-17 PROCEDURE — 250N000009 HC RX 250

## 2024-11-17 PROCEDURE — 250N000009 HC RX 250: Performed by: FAMILY MEDICINE

## 2024-11-17 PROCEDURE — 97162 PT EVAL MOD COMPLEX 30 MIN: CPT | Mod: GP

## 2024-11-17 PROCEDURE — 82435 ASSAY OF BLOOD CHLORIDE: CPT

## 2024-11-17 PROCEDURE — 97165 OT EVAL LOW COMPLEX 30 MIN: CPT | Mod: GO

## 2024-11-17 PROCEDURE — 82565 ASSAY OF CREATININE: CPT

## 2024-11-17 PROCEDURE — 84100 ASSAY OF PHOSPHORUS: CPT | Performed by: FAMILY MEDICINE

## 2024-11-17 PROCEDURE — 250N000013 HC RX MED GY IP 250 OP 250 PS 637: Performed by: FAMILY MEDICINE

## 2024-11-17 PROCEDURE — 83735 ASSAY OF MAGNESIUM: CPT | Performed by: FAMILY MEDICINE

## 2024-11-17 PROCEDURE — 250N000011 HC RX IP 250 OP 636

## 2024-11-17 PROCEDURE — 85041 AUTOMATED RBC COUNT: CPT

## 2024-11-17 PROCEDURE — 36415 COLL VENOUS BLD VENIPUNCTURE: CPT

## 2024-11-17 RX ORDER — METOPROLOL TARTRATE 1 MG/ML
5 INJECTION, SOLUTION INTRAVENOUS ONCE
Status: COMPLETED | OUTPATIENT
Start: 2024-11-17 | End: 2024-11-17

## 2024-11-17 RX ORDER — METOPROLOL TARTRATE 25 MG/1
50 TABLET, FILM COATED ORAL 2 TIMES DAILY
Status: DISCONTINUED | OUTPATIENT
Start: 2024-11-17 | End: 2024-11-17

## 2024-11-17 RX ORDER — METOPROLOL TARTRATE 25 MG/1
100 TABLET, FILM COATED ORAL 2 TIMES DAILY
Status: DISCONTINUED | OUTPATIENT
Start: 2024-11-17 | End: 2024-11-18

## 2024-11-17 RX ORDER — OLANZAPINE 10 MG/2ML
2.5 INJECTION, POWDER, FOR SOLUTION INTRAMUSCULAR
Status: COMPLETED | OUTPATIENT
Start: 2024-11-17 | End: 2024-11-17

## 2024-11-17 RX ORDER — HYDROXYZINE HYDROCHLORIDE 10 MG/1
10 TABLET, FILM COATED ORAL 2 TIMES DAILY PRN
Status: DISCONTINUED | OUTPATIENT
Start: 2024-11-17 | End: 2024-11-20

## 2024-11-17 RX ADMIN — METOPROLOL TARTRATE 50 MG: 25 TABLET, FILM COATED ORAL at 08:31

## 2024-11-17 RX ADMIN — QUETIAPINE FUMARATE 12.5 MG: 25 TABLET ORAL at 13:00

## 2024-11-17 RX ADMIN — APIXABAN 5 MG: 5 TABLET, FILM COATED ORAL at 21:39

## 2024-11-17 RX ADMIN — VENLAFAXINE HYDROCHLORIDE 150 MG: 150 CAPSULE, EXTENDED RELEASE ORAL at 08:34

## 2024-11-17 RX ADMIN — METOPROLOL TARTRATE 100 MG: 25 TABLET, FILM COATED ORAL at 18:34

## 2024-11-17 RX ADMIN — LEVOTHYROXINE SODIUM 75 MCG: 0.03 TABLET ORAL at 08:48

## 2024-11-17 RX ADMIN — DONEPEZIL HYDROCHLORIDE 10 MG: 5 TABLET, FILM COATED ORAL at 11:28

## 2024-11-17 RX ADMIN — METOPROLOL TARTRATE 5 MG: 5 INJECTION INTRAVENOUS at 12:46

## 2024-11-17 RX ADMIN — ATORVASTATIN CALCIUM 80 MG: 40 TABLET, FILM COATED ORAL at 21:39

## 2024-11-17 RX ADMIN — MIRTAZAPINE 30 MG: 7.5 TABLET, FILM COATED ORAL at 22:09

## 2024-11-17 RX ADMIN — QUETIAPINE FUMARATE 12.5 MG: 25 TABLET ORAL at 21:39

## 2024-11-17 RX ADMIN — BUSPIRONE HYDROCHLORIDE 10 MG: 10 TABLET ORAL at 20:38

## 2024-11-17 RX ADMIN — APIXABAN 5 MG: 5 TABLET, FILM COATED ORAL at 08:33

## 2024-11-17 RX ADMIN — OLANZAPINE 2.5 MG: 10 INJECTION, POWDER, FOR SOLUTION INTRAMUSCULAR at 15:24

## 2024-11-17 RX ADMIN — BUSPIRONE HYDROCHLORIDE 10 MG: 10 TABLET ORAL at 08:34

## 2024-11-17 RX ADMIN — METOPROLOL TARTRATE 5 MG: 5 INJECTION INTRAVENOUS at 02:00

## 2024-11-17 ASSESSMENT — ACTIVITIES OF DAILY LIVING (ADL)
ADLS_ACUITY_SCORE: 22.25
ADLS_ACUITY_SCORE: 23.25
ADLS_ACUITY_SCORE: 22.25
ADLS_ACUITY_SCORE: 23.25
ADLS_ACUITY_SCORE: 22.25
ADLS_ACUITY_SCORE: 23.25
ADLS_ACUITY_SCORE: 22.25
ADLS_ACUITY_SCORE: 23.25
ADLS_ACUITY_SCORE: 22.25
ADLS_ACUITY_SCORE: 23.25
ADLS_ACUITY_SCORE: 23.25
ADLS_ACUITY_SCORE: 22.25
ADLS_ACUITY_SCORE: 22.25
ADLS_ACUITY_SCORE: 23.25
ADLS_ACUITY_SCORE: 22.25
ADLS_ACUITY_SCORE: 23.25
ADLS_ACUITY_SCORE: 23.25
ADLS_ACUITY_SCORE: 22.25
ADLS_ACUITY_SCORE: 23.25
ADLS_ACUITY_SCORE: 22.25

## 2024-11-17 NOTE — PROGRESS NOTES
11/17/24 1000   Appointment Info   Signing Clinician's Name / Credentials (PT) Nicolle Terrell,PT   Living Environment   People in Home facility resident   Current Living Arrangements assisted living   Home Accessibility no concerns   Self-Care   Fall history within last six months no   Activity/Exercise/Self-Care Comment Per family, pt is able to do most of her ADLs but has some assist with bathing. Pt has cane but does not always use it.   General Information   Onset of Illness/Injury or Date of Surgery 11/14/24   Referring Physician Dr. Anne Marie Brooks   Patient/Family Therapy Goals Statement (PT) none stated   Pertinent History of Current Problem (include personal factors and/or comorbidities that impact the POC) Markus Berkowitz is a 89 year old female admitted on 11/14/2024. She has a history of dementia, hypothyroidism, HTN, CKD3a, depression, and anxiety and is admitted for acute ischemic stroke of left parietal lobe likely due to cardio embolism in the setting of new onset atrial fibrillation with rapid ventricular response.      Acute Ischemic Left MCA stroke  Dysarthria  Came in with AMS. Nonsensical speech and dysarthria. CT Head showed Wedge-shaped focus of loss of gray-white matter differentiation left parietal lobe measuring 3.1 x 3 cm, concerning for an late acute/early subacute infarct. CTA notable for distal left M2 occlusion and 90% stenosis of left ICA. MRI w/ left MCA occlusion evidence. Suspect due to cardio embolism from atrial fibrillation vs plaque embolization from internal carotid.   Existing Precautions/Restrictions other (see comments)  (pt on 1:1)   General Observations pt in bed family in room   Cognition   Follows Commands (Cognition) follows one-step commands;75-90% accuracy;verbal cues/prompting required;repetition of directions required   Cognitive Status Comments pt [place],expressive aphasia   Pain Assessment   Patient Currently in Pain   (no signs of pain, pt unable to state)    Range of Motion (ROM)   ROM Comment BLE WFLfor transfers   Strength (Manual Muscle Testing)   Strength Comments BLE WFL for transfers   Bed Mobility   Impairments Contributing to Impaired Bed Mobility other (see comments)  (cognition)   Assistive Device (Bed Mobility) other (see comments)  (HOB elevated)   Comment, (Bed Mobility) verbal cues , SBA   Transfers   Transfer Safety Concerns Noted other (see comments)  (pt walking away from FWW)   Impairments Contributing to Impaired Transfers other (see comments)  (safety awareness)   Comment, (Transfers) SBA/CGA w/FWW   Gait/Stairs (Locomotion)   [place] Level (Gait) contact guard   Assistive Device (Gait) walker, front-wheeled   Distance in Feet (Gait) 3', 15'   Pattern (Gait) step-through   Deviations/Abnormal Patterns (Gait) weight shifting decreased   Comment, (Gait/Stairs) A with steering FW in room   Balance   Balance Comments SBA/CGA w/ FWW   Clinical Impression   Criteria for Skilled Therapeutic Intervention Yes, treatment indicated   PT Diagnosis (PT) decreased functional mobility   Influenced by the following impairments decreased endurence cognition   Functional limitations due to impairments bed mob, transfers . gait   Clinical Presentation (PT Evaluation Complexity) evolving   Clinical Presentation Rationale presents as medically diagnosed   Clinical Decision Making (Complexity) moderate complexity   Planned Therapy Interventions (PT) bed mobility training;gait training;strengthening;transfer training   Risk & Benefits of therapy have been explained evaluation/treatment results reviewed;patient   PT Total Evaluation Time   PT Eval, Moderate Complexity Minutes (15305) 12   Physical Therapy Goals   PT Frequency 3x/week   PT Predicted Duration/Target Date for Goal Attainment 11/24/24   PT Goals Bed Mobility;Transfers;Gait   PT: Bed Mobility Supervision/stand-by assist;Supine to/from sit   PT: Transfers Supervision/stand-by assist;Sit to/from stand;Bed  to/from chair;Assistive device   PT: Gait Supervision/stand-by assist;Rolling walker;100 feet   Interventions   Interventions Quick Adds Gait Training   Gait Training   Gait Training Minutes (68988) 10   Symptoms Noted During/After Treatment (Gait Training) fatigue   Treatment Detail/Skilled Intervention cues to stay with FWW,   Distance in Feet 30'   [place] Level (Gait Training) contact guard   Physical Assistance Level (Gait Training) 1 person assist   Assistive Device (Gait Training) rolling walker   Pattern Analysis (Gait Training) swing-through gait   Impairments (Gait Analysis/Training) other (see comments)  (safety awareness)   PT Discharge Planning   PT Plan bed mob,transfers and gait w/ FWW increase endurence and use of FWW   PT Discharge Recommendation (DC Rec) other (see comments)  (memory care, 24 supervision for safety and further PT for gait /transfers)   PT Rationale for DC Rec pt BSA/CGA for limited mobility   PT Brief overview of current status bed mob SBA, transfes SBA/CGA w/ FWW, gait 30'w/ FWW CGA   PT Equipment Needed at Discharge walker, rolling

## 2024-11-17 NOTE — PROGRESS NOTES
[facility]    Medicine Progress Note - Hospitalist Service    Date of Admission:  11/14/2024    Assessment & Plan   Markus Berkowitz is a 89 year old female admitted on 11/14/2024. She has a history of dementia, hypothyroidism, HTN, CKD3a, depression, and anxiety and is admitted for acute ischemic stroke of left parietal lobe likely due to cardio embolism in the setting of new onset atrial fibrillation with rapid ventricular response.      Acute Ischemic Left MCA stroke  Dysarthria  Came in with AMS. Nonsensical speech and dysarthria. CT Head showed Wedge-shaped focus of loss of gray-white matter differentiation left parietal lobe measuring 3.1 x 3 cm, concerning for an late acute/early subacute infarct. CTA notable for distal left M2 occlusion and 90% stenosis of left ICA. MRI w/ left MCA occlusion evidence. Suspect due to cardio embolism from atrial fibrillation vs plaque embolization from internal carotid. A1C 6.0. , total cholesterol 366. Unable to perform thorough neuro exam today as patient was agitated and refusing. RN reports she was able to follow commands, speech does continue to by dysarthric.   Neurology consulted, appreciate recs  - Neurochecks and Vital Signs every 4 hours   - Permissive HTN; goal SBP < 220 mmHg   (Will need some rate control for a fib, see below, which will slightly lower pressures)  - Atorvastatin 80mg daily  - Eliquis  - Telemetry  - PT/OT/SLP  - Stroke Education  - Euthermia, Euglycemia     Atrial fibrillation with RVR  Intermittent conversions to sinus tachycardia  Atrial fibrillation with RVR upon arrival to the ED on telemetry. HR 130s. EKG confirmed. No ischemic changes, no prior to compare to. No known history of A-fib or heart failure. Initially, rate controlled in the ED with diltiazem, then placed on diltiazem gtt for persistent RVR and was then transitioned to metoprolol; unfortunately, she refuses medications intermittently. During the  day 11/15, patient spontaneously converted to sinus tachycardia w/ rates 110-120, confirmed with EKG. Echo with mild to moderate dilation of left atrium and mild decrease in right ventricular systolic function.   - Eliquis  - Metoprolol 50mg >> 100mg BID PO, hold for MAP < 65   - IV metoprolol 5 mg q6h PRN for HR > 110 (as patient refused PO medications this morning)  - Telemetry    Hypothyroidism   Patient has hx of hypothyroidism. PTA 75 mcg levothyroxine daily. TSH on admission elevated to 8.45. T4 0.73. Could consider increasing levothyroxine dose prior to discharge.   - PTA levothyroxine 75 mcg     Intermittent Agitation  Dementia   Anxiety/Depression  - PTA donepezil, hydroxyzine, venlafaxine, mirtazapine  - Delirium precautions  - Redirect first if able  -Serouel 12.5mg one-time PRN if agitated/aggressive. Zyprexa 2.5 mg BID IM PRN if needed     CKD3a  MARIA ELENA   Patient's Cr 1.2 on admission, up from baseline of 1.1. Likely prerenal. S/p 500 mL bolus in ED.   - strict I&Os  - avoid nephrotoxic medications     HTN:   - hold PTA amlodipine 10 mg daily. Currently allowing permissive HTN, goal SBP < 220.     Goals of Care  Had care conference today. Proceed with DNR/DNI per POLST/Advanced directive.  On 11/16, attending Dr. Brooks discussed possibility of carotid endarterectomy with family.  Family is clear that this type of surgery would not be within patient's goals of care given her current status.  No surgery but otherwise full medical cares.  Son Eduardo is POA.           Diet: Combination Diet Easy to Chew (level 7); Thin Liquids (level 0); Thin Liquids (level 0)    DVT Prophylaxis: Enoxaparin (Lovenox) SQ  Griffiths Catheter: Not present  Lines: None     Cardiac Monitoring: ACTIVE order. Indication: Tachyarrhythmias, acute (48 hours)  Code Status: No CPR- Do NOT Intubate      Clinically Significant Risk Factors                              # Obesity: Estimated body mass index is 37.54 kg/m  as calculated from the  "following:    Height as of this encounter: 1.575 m (5' 2\").    Weight as of this encounter: 93.1 kg (205 lb 4 oz)., PRESENT ON ADMISSION     # Financial/Environmental Concerns: none         Social Drivers of Health    Interpersonal Safety: Unknown (11/16/2024)    Interpersonal Safety     Do you feel physically and emotionally safe where you currently live?: Patient unable to answer     Within the past 12 months, have you been hit, slapped, kicked or otherwise physically hurt by someone?: Patient unable to answer     Within the past 12 months, have you been humiliated or emotionally abused in other ways by your partner or ex-partner?: Patient unable to answer          Disposition Plan     Medically Ready for Discharge: Anticipated in 2-4 Days       The patient's care was discussed with the Attending Physician, Dr. Brooks .    Pepe Segovia MD  Hospitalist Service  [facility]  Securely message with [employer] (more info)  Text page via [employer] Paging/Directory   ______________________________________________________________________    Interval History   Episode of agitation overnight when going to the bathroom, refusing assistance from staff. Received IM zyprexa once and slept well through the night.     BM x1, this AM. While moving from bathroom to bed, she got very agitated once again and started swinging at staff, trying to open the door and leave. Was redirectable towards chair and calmed down once she started watching golf. Did not want to be touched today.     Physical Exam   Vital Signs: Temp: 97.3  F (36.3  C) Temp src: Oral BP: 105/63 Pulse: 120   Resp: 20 SpO2: 92 % O2 Device: None (Room air)    Weight: 205 lbs 3.97 oz    General: Agitated, aggressive but redirectable. Speech dysarthric  Skin: Warm, dry, intact no rashes or lesions.  HEENT: Normocephalic, atraumatic. Sclera non-icteric.   Cardiac: Appears well-perfused.   Respiratory: Normal work of breathing.   Extremities: No edema. " Atraumatic.  Neurological: Alert, not oriented x4. Gait unsteady, no facial droop, speech dysarthric.   Psych: Mood labile    Medical Decision Making             Data

## 2024-11-17 NOTE — PLAN OF CARE
"  Problem: Adult Inpatient Plan of Care  Goal: Plan of Care Review  Description: The Plan of Care Review/Shift note should be completed every shift.  The Outcome Evaluation is a brief statement about your assessment that the patient is improving, declining, or no change.  This information will be displayed automatically on your shift  note.  Outcome: Progressing     Problem: Confusion Acute  Goal: Optimal Cognitive Function  Outcome: Progressing     Problem: Stroke, Ischemic (Includes Transient Ischemic Attack)  Goal: Effective Bowel Elimination  Outcome: Progressing   Goal Outcome Evaluation:             0800-Patient follows simple commands (blink, squeeze hands).  Unable to state her name (responded with \"I don't know\") but responds when her name is called.  Heart rate Sinus tach this morning.  She took AM meds without resistance, including metoprolol.  Remains on 1:1.  She is pleasant and calm this morning.  Awake and watching TV.  Glasses on.    1200- Heart rate still in the upper 110s.  One time order for IV metoprolol.  Hospitalist ok to give with SBP 95.  Patient getting agitated.  She was up to bathroom and is now sitting up in recliner.  She wants to leave and is resistive to redirection (pushing/swatting staff hands away, pulling BP cuff and line).  Assisted back to bed with 3 assist and PRN seroquel given.    1420- Patient calm and receptive to cares.  She is awake and watching TV.    Heart rate still in the upper 110s.  Hospitalist informed via Interactive TKOt message            "

## 2024-11-17 NOTE — PLAN OF CARE
Problem: Stroke, Ischemic (Includes Transient Ischemic Attack)  Goal: Optimal Functional Ability  Outcome: Progressing  Intervention: Optimize Functional Ability  Recent Flowsheet Documentation  Taken 11/17/2024 0432 by Melissa Baptiste, RN  Activity Management: activity adjusted per tolerance  Taken 11/17/2024 0429 by Melissa Baptiste, RN  Activity Management: activity adjusted per tolerance  Taken 11/16/2024 2353 by Melissa Baptiste, RN  Activity Management:   activity adjusted per tolerance   ambulated to bathroom   Goal Outcome Evaluation:    Pt restful over noc. Slept majority of shift. Occasionally speaking in short, clear phrases. Intermittently restless, agitated with cares. Aflutter vs Sinus tachy on tele, rates 110-120. Prn IV metoprolol given with slight improvement in heart rate. One to one sitter present at bedside for safety entire night.

## 2024-11-17 NOTE — SIGNIFICANT EVENT
At 1730, pt asked to go to the bathroom and couldn't wait for help. Once she was in the bathroom, she refused to have someone with her. She is unsteady. She became agitated and started to strike out the staff who tried to help her clean up. Notified MD, Zyprexa 2.5 mg IM given as ordered. Pt still agitated and refused to go back to bed with help, needed 4 people to help her back to bed. She was still restless and tried to get out of the bed. She was trying  to hit the staff who came near her or touched her, soft restraints in all extremities  applied at 1800 after getting an approval from MD. Pt started to calm down at 1835, all limbs restraints were taken off.

## 2024-11-17 NOTE — PROGRESS NOTES
4:26 PM  Brief Interval Note    Notified by RN that patient was agitated and fighting staff and was standing had to be lowered to the floor.  She did not have a fall.  She was received as needed Seroquel and Zyprexa IM already.    On evaluation, patient was calm and trying to figure out how to work the remote volume.  One-to-one at bedside.  She denied having pain anywhere.  When asked to examine her, she declined.  Given that she was in no acute distress and had no traumatic fall, reasonable to hold off on exam.     -Reorder PTA hydroxyzine 10mg twice daily today, will defer to day team to see if he would like to continue it.  -Seroquel 12.5 at bedtime this evening.  -May need to switch to different class for antipsychotics if she does get agitated again.  Consider Haldol.

## 2024-11-17 NOTE — PLAN OF CARE
Problem: Confusion Acute  Goal: Optimal Cognitive Function  Outcome: Progressing  Intervention: Minimize Contributing Factors  Recent Flowsheet Documentation  Taken 11/16/2024 1930 by Lisa Elaine RN  Sensory Stimulation Regulation: music on  Reorientation Measures: reorientation provided  Communication Support Strategies: one-step directions provided  Taken 11/16/2024 1600 by Lisa Elaine RN  Sensory Stimulation Regulation: music on  Reorientation Measures: reorientation provided  Communication Support Strategies: one-step directions provided     Problem: Dysrhythmia  Goal: Normalized Cardiac Rhythm  Outcome: Progressing  Intervention: Monitor and Manage Cardiac Rhythm Effect  Recent Flowsheet Documentation  Taken 11/16/2024 1930 by Lisa Elaine RN  VTE Prevention/Management: (Lines minimized for safety. Patient moves in bed frequently) SCDs off (sequential compression devices)  Taken 11/16/2024 1600 by Lisa Elaine RN  VTE Prevention/Management: (Lines minimized for safety. Patient moves in bed frequently) SCDs off (sequential compression devices)     Problem: Skin Injury Risk Increased  Goal: Skin Health and Integrity  Outcome: Progressing  Intervention: Plan: Nurse Driven Intervention: Moisture Management  Recent Flowsheet Documentation  Taken 11/16/2024 1930 by Lisa Elaine RN  Moisture Interventions: Incontinence pad  Taken 11/16/2024 1600 by Lisa Elaine RN  Moisture Interventions: Incontinence pad  Intervention: Plan: Nurse Driven Intervention: Friction and Shear  Recent Flowsheet Documentation  Taken 11/16/2024 1930 by Lisa Elaine RN  Friction/Shear Interventions: HOB 30 degrees or less  Taken 11/16/2024 1600 by Lisa Elaine RN  Friction/Shear Interventions: HOB 30 degrees or less  Intervention: Optimize Skin Protection  Recent Flowsheet Documentation  Taken 11/16/2024 1930 by Lisa Elaine RN  Activity  Management: activity adjusted per tolerance  Head of Bed (HOB) Positioning: HOB at 20-30 degrees  Taken 11/16/2024 1600 by Lisa Elaine RN  Activity Management: activity adjusted per tolerance  Head of Bed (HOB) Positioning: HOB at 20-30 degrees     Problem: Stroke, Ischemic (Includes Transient Ischemic Attack)  Goal: Optimal Coping  Outcome: Progressing  Goal: Effective Bowel Elimination  Outcome: Progressing  Goal: Optimal Cerebral Tissue Perfusion  Outcome: Progressing  Intervention: Protect and Optimize Cerebral Perfusion  Recent Flowsheet Documentation  Taken 11/16/2024 1930 by Lisa Eliane RN  Sensory Stimulation Regulation: music on  Taken 11/16/2024 1600 by Lisa Elaine RN  Sensory Stimulation Regulation: music on  Goal: Optimal Cognitive Function  Outcome: Progressing  Intervention: Optimize Cognitive Function  Recent Flowsheet Documentation  Taken 11/16/2024 1930 by Lisa Elaine RN  Sensory Stimulation Regulation: music on  Reorientation Measures: reorientation provided  Taken 11/16/2024 1600 by Lisa Elaine RN  Sensory Stimulation Regulation: music on  Reorientation Measures: reorientation provided  Goal: Improved Communication Skills  Outcome: Progressing  Goal: Optimal Functional Ability  Outcome: Progressing  Intervention: Optimize Functional Ability  Recent Flowsheet Documentation  Taken 11/16/2024 1930 by Lisa Elaine RN  Activity Management: activity adjusted per tolerance  Taken 11/16/2024 1600 by Lisa Elaine RN  Activity Management: activity adjusted per tolerance  Goal: Optimal Nutrition Intake  Outcome: Progressing  Goal: Effective Oxygenation and Ventilation  Outcome: Progressing  Intervention: Optimize Oxygenation and Ventilation  Recent Flowsheet Documentation  Taken 11/16/2024 1930 by Lisa Elaine RN  Head of Bed (HOB) Positioning: HOB at 20-30 degrees  Taken 11/16/2024 1600 by Lisa Elaine  "RN  Head of Bed (HOB) Positioning: HOB at 20-30 degrees  Goal: Improved Sensorimotor Function  Outcome: Progressing  Goal: Safe and Effective Swallow  Outcome: Progressing     Problem: Restraint, Nonviolent  Goal: Absence of Harm or Injury  Outcome: Progressing  Intervention: Protect Dignity, Rights and Personal Wellbeing  Recent Flowsheet Documentation  Taken 11/16/2024 1930 by Lisa Elaine RN  Trust Relationship/Rapport: reassurance provided  Taken 11/16/2024 1600 by Lisa Elaine RN  Trust Relationship/Rapport: reassurance provided  Intervention: Protect Skin and Joint Integrity  Recent Flowsheet Documentation  Taken 11/16/2024 1930 by Lisa Elaine RN  Body Position:   weight shifting   position changed independently  Taken 11/16/2024 1600 by Lisa Elaine RN  Body Position:   weight shifting   position changed independently   Goal Outcome Evaluation:       Pt is alert and disoriented x 4. Lung sounds diminished, on RA,SpO2 90 to 93 % . HR in the 110's to 120, Sinus tachy. IV Metoprolol 5 mg given at 1700, HR still in the 110's to 120. PO Metoprolol 25 mg given early at 1930. HR still in the 110's. Pt is calm, confused, unable to make needs known except bathroom. Gait unsteady, needs 2 assist with gait belt. Takes pills crushed with ice cream. She did not eat dinner, ate the  cream 100 % and a pack of popcorn tonight. No difficulty swallowing food or fluids. Had 2 loose BM tonight, she is incontinent with bladder. Pt is enjoying watching \"Dancing with the stars \" in the you tube.                 "

## 2024-11-17 NOTE — PROGRESS NOTES
11/17/24 0955   Appointment Info   Signing Clinician's Name / Credentials (OT) Rosa THOMAS babar Wagnerjhonny ARANGO/L   Living Environment   People in Home facility resident   Current Living Arrangements assisted living   Home Accessibility no concerns   Self-Care   Current Activity Tolerance fair   Equipment Currently Used at Home cane, straight   Fall history within last six months no   Activity/Exercise/Self-Care Comment Per family, pt is able to do most of her ADLs but has some assist with bathing.  Pt has cane but does not always use it.   General Information   Onset of Illness/Injury or Date of Surgery 11/14/24   Referring Physician Ganesh   Patient/Family Therapy Goal Statement (OT) none stated   Additional Occupational Profile Info/Pertinent History of Current Problem Pt admitted with acute ischemic L MCA stroke, agitation, A  fib   Existing Precautions/Restrictions fall;other (see comments)  (one to one)   Cognitive Status Examination   Cognitive Status Comments impaired, dysarthria, [place], agitated at times, decreased safety awareness   Visual Perception   Visual Impairment/Limitations WFL   Sensory   Sensory Quick Adds sensation intact   Range of Motion Comprehensive   General Range of Motion no range of motion deficits identified   Strength Comprehensive (MMT)   Comment, General Manual Muscle Testing (MMT) Assessment Not formally tested but appears WFL for ADLs   Coordination   Upper Extremity Coordination No deficits were identified   Bed Mobility   Comment (Bed Mobility) SBA with rail   Transfers   Transfer Comments CGA   Balance   Balance Comments CGA   Activities of Daily Living   BADL Assessment/Intervention toileting   Toileting   Comment, (Toileting) Min A for clothing management   Clinical Impression   Criteria for Skilled Therapeutic Interventions Met (OT) Yes, treatment indicated   OT Diagnosis Impaired ADL independence   OT Problem List-Impairments impacting ADL balance;cognition;mobility    Assessment of Occupational Performance 1-3 Performance Deficits   Planned Therapy Interventions (OT) ADL retraining;balance training;cognition;transfer training   Clinical Decision Making Complexity (OT) problem focused assessment/low complexity   Risk & Benefits of therapy have been explained evaluation/treatment results reviewed;participants included;patient   Clinical Impression Comments Pt seen bedside for OT eval and treatment.  Pt demonstrates impaired independence with ADLs and mobiltiy as well as impaired cognition.  OT to continue to address.  Recommend memory care with home therapy at discharge.  Pt will need 24 hour suprvision for safety with ADLs and mobility.   OT Total Evaluation Time   OT Eval, Low Complexity Minutes (26993) 10   OT Goals   Therapy Frequency (OT) 5 times/week   OT Predicted Duration/Target Date for Goal Attainment 11/24/24   OT Goals Hygiene/Grooming;Lower Body Dressing;Transfers;Toilet Transfer/Toileting;OT Goal 1   OT: Hygiene/Grooming supervision/stand-by assist   OT: Lower Body Dressing Supervision/stand-by assist   OT: Transfer Supervision/stand-by assist;with assistive device   OT: Toilet Transfer/Toileting Supervision/stand-by assist   OT: Goal 1 Pt will particiapte in formal cognitive assessment as  needed for discharge planning.   OT Discharge Planning   OT Plan basic ADLs, mobility with walker, cog screen?   OT Discharge Recommendation (DC Rec) other (see comments)  (memory care)   OT Rationale for DC Rec Recommend memory care unit as pt will need 24 hour supervision for safety with mobility and ADLs.  Pt may benefit from some home OT to help maximize ADL independence in new environment.   OT Brief overview of current status CGA

## 2024-11-18 ENCOUNTER — APPOINTMENT (OUTPATIENT)
Dept: SPEECH THERAPY | Facility: HOSPITAL | Age: 89
End: 2024-11-18
Payer: MEDICARE

## 2024-11-18 ENCOUNTER — APPOINTMENT (OUTPATIENT)
Dept: OCCUPATIONAL THERAPY | Facility: HOSPITAL | Age: 89
End: 2024-11-18
Payer: MEDICARE

## 2024-11-18 LAB
ANION GAP SERPL CALCULATED.3IONS-SCNC: 11 MMOL/L (ref 7–15)
BUN SERPL-MCNC: 21.4 MG/DL (ref 8–23)
CALCIUM SERPL-MCNC: 9.4 MG/DL (ref 8.8–10.4)
CHLORIDE SERPL-SCNC: 105 MMOL/L (ref 98–107)
CREAT SERPL-MCNC: 1.08 MG/DL (ref 0.51–0.95)
EGFRCR SERPLBLD CKD-EPI 2021: 48 ML/MIN/1.73M2
ERYTHROCYTE [DISTWIDTH] IN BLOOD BY AUTOMATED COUNT: 13.5 % (ref 10–15)
GLUCOSE SERPL-MCNC: 140 MG/DL (ref 70–99)
HCO3 SERPL-SCNC: 24 MMOL/L (ref 22–29)
HCT VFR BLD AUTO: 43.1 % (ref 35–47)
HGB BLD-MCNC: 14.2 G/DL (ref 11.7–15.7)
MAGNESIUM SERPL-MCNC: 1.9 MG/DL (ref 1.7–2.3)
MCH RBC QN AUTO: 31.4 PG (ref 26.5–33)
MCHC RBC AUTO-ENTMCNC: 32.9 G/DL (ref 31.5–36.5)
MCV RBC AUTO: 95 FL (ref 78–100)
PHOSPHATE SERPL-MCNC: 3.2 MG/DL (ref 2.5–4.5)
PLATELET # BLD AUTO: 263 10E3/UL (ref 150–450)
POTASSIUM SERPL-SCNC: 3.8 MMOL/L (ref 3.4–5.3)
RBC # BLD AUTO: 4.52 10E6/UL (ref 3.8–5.2)
SODIUM SERPL-SCNC: 140 MMOL/L (ref 135–145)
WBC # BLD AUTO: 7.8 10E3/UL (ref 4–11)

## 2024-11-18 PROCEDURE — 80051 ELECTROLYTE PANEL: CPT

## 2024-11-18 PROCEDURE — 84100 ASSAY OF PHOSPHORUS: CPT | Performed by: FAMILY MEDICINE

## 2024-11-18 PROCEDURE — 97535 SELF CARE MNGMENT TRAINING: CPT | Mod: GO

## 2024-11-18 PROCEDURE — 250N000013 HC RX MED GY IP 250 OP 250 PS 637

## 2024-11-18 PROCEDURE — 99232 SBSQ HOSP IP/OBS MODERATE 35: CPT | Mod: GC | Performed by: FAMILY MEDICINE

## 2024-11-18 PROCEDURE — 83735 ASSAY OF MAGNESIUM: CPT | Performed by: FAMILY MEDICINE

## 2024-11-18 PROCEDURE — 82565 ASSAY OF CREATININE: CPT

## 2024-11-18 PROCEDURE — 97550 CAREGIVER TRAING 1ST 30 MIN: CPT | Mod: GN

## 2024-11-18 PROCEDURE — 36415 COLL VENOUS BLD VENIPUNCTURE: CPT

## 2024-11-18 PROCEDURE — 85014 HEMATOCRIT: CPT

## 2024-11-18 PROCEDURE — 250N000009 HC RX 250

## 2024-11-18 PROCEDURE — 97110 THERAPEUTIC EXERCISES: CPT | Mod: GO

## 2024-11-18 PROCEDURE — 80048 BASIC METABOLIC PNL TOTAL CA: CPT

## 2024-11-18 PROCEDURE — 120N000004 HC R&B MS OVERFLOW

## 2024-11-18 RX ORDER — DILTIAZEM HYDROCHLORIDE 30 MG/1
30 TABLET, FILM COATED ORAL 4 TIMES DAILY
Status: DISCONTINUED | OUTPATIENT
Start: 2024-11-18 | End: 2024-11-19

## 2024-11-18 RX ORDER — DILTIAZEM HYDROCHLORIDE 30 MG/1
30 TABLET, FILM COATED ORAL EVERY 6 HOURS PRN
Status: DISCONTINUED | OUTPATIENT
Start: 2024-11-18 | End: 2024-11-19

## 2024-11-18 RX ORDER — OLANZAPINE 10 MG/2ML
5 INJECTION, POWDER, FOR SOLUTION INTRAMUSCULAR ONCE
Status: COMPLETED | OUTPATIENT
Start: 2024-11-18 | End: 2024-11-18

## 2024-11-18 RX ADMIN — DILTIAZEM HYDROCHLORIDE 30 MG: 30 TABLET, FILM COATED ORAL at 17:10

## 2024-11-18 RX ADMIN — LEVOTHYROXINE SODIUM 75 MCG: 0.03 TABLET ORAL at 09:27

## 2024-11-18 RX ADMIN — DILTIAZEM HYDROCHLORIDE 30 MG: 30 TABLET, FILM COATED ORAL at 20:51

## 2024-11-18 RX ADMIN — DILTIAZEM HYDROCHLORIDE 30 MG: 30 TABLET, FILM COATED ORAL at 13:48

## 2024-11-18 RX ADMIN — MIRTAZAPINE 30 MG: 7.5 TABLET, FILM COATED ORAL at 21:04

## 2024-11-18 RX ADMIN — ATORVASTATIN CALCIUM 80 MG: 40 TABLET, FILM COATED ORAL at 20:51

## 2024-11-18 RX ADMIN — APIXABAN 5 MG: 5 TABLET, FILM COATED ORAL at 09:28

## 2024-11-18 RX ADMIN — BUSPIRONE HYDROCHLORIDE 10 MG: 10 TABLET ORAL at 09:31

## 2024-11-18 RX ADMIN — BUSPIRONE HYDROCHLORIDE 10 MG: 10 TABLET ORAL at 20:51

## 2024-11-18 RX ADMIN — QUETIAPINE FUMARATE 12.5 MG: 25 TABLET ORAL at 20:52

## 2024-11-18 RX ADMIN — VENLAFAXINE HYDROCHLORIDE 150 MG: 150 CAPSULE, EXTENDED RELEASE ORAL at 09:31

## 2024-11-18 RX ADMIN — METOPROLOL TARTRATE 5 MG: 5 INJECTION INTRAVENOUS at 04:47

## 2024-11-18 RX ADMIN — METOPROLOL TARTRATE 5 MG: 5 INJECTION INTRAVENOUS at 12:36

## 2024-11-18 RX ADMIN — APIXABAN 5 MG: 5 TABLET, FILM COATED ORAL at 20:51

## 2024-11-18 ASSESSMENT — ACTIVITIES OF DAILY LIVING (ADL)
ADLS_ACUITY_SCORE: 22.75
ADLS_ACUITY_SCORE: 23.75
ADLS_ACUITY_SCORE: 22.75
ADLS_ACUITY_SCORE: 23.75
ADLS_ACUITY_SCORE: 23.25
ADLS_ACUITY_SCORE: 23.75
ADLS_ACUITY_SCORE: 23.25
ADLS_ACUITY_SCORE: 23.25
ADLS_ACUITY_SCORE: 23.75
ADLS_ACUITY_SCORE: 22.75
ADLS_ACUITY_SCORE: 23.75
ADLS_ACUITY_SCORE: 23.75
ADLS_ACUITY_SCORE: 23.25
ADLS_ACUITY_SCORE: 22.75
ADLS_ACUITY_SCORE: 23.25
ADLS_ACUITY_SCORE: 23.75
ADLS_ACUITY_SCORE: 23.25

## 2024-11-18 NOTE — PLAN OF CARE
Problem: Confusion Acute  Goal: Optimal Cognitive Function  Intervention: Minimize Contributing Factors  Recent Flowsheet Documentation  Taken 11/18/2024 0400 by Khanh Solorzano RN  Sensory Stimulation Regulation:   lighting decreased   care clustered   television on  Reorientation Measures: reorientation provided  Communication Support Strategies:   one-step directions provided   extra time allowed for response  Taken 11/18/2024 0000 by Khanh Solorzano RN  Sensory Stimulation Regulation:   lighting decreased   care clustered   television on  Reorientation Measures: reorientation provided  Communication Support Strategies:   one-step directions provided   extra time allowed for response     Problem: Stroke, Ischemic (Includes Transient Ischemic Attack)  Goal: Improved Communication Skills  Outcome: Progressing     Problem: Adult Inpatient Plan of Care  Goal: Absence of Hospital-Acquired Illness or Injury  Intervention: Identify and Manage Fall Risk  Recent Flowsheet Documentation  Taken 11/18/2024 0400 by Khanh Solorzano RN  Safety Promotion/Fall Prevention:   nonskid shoes/slippers when out of bed   patient and family education   supervised activity   clutter free environment maintained   activity supervised   lighting adjusted   treat reversible contributory factors  Taken 11/18/2024 0000 by Khanh Solorzano RN  Safety Promotion/Fall Prevention:   nonskid shoes/slippers when out of bed   patient and family education   supervised activity   clutter free environment maintained   activity supervised   lighting adjusted   treat reversible contributory factors   Goal Outcome Evaluation:                 Outcome Evaluation: Pt slept half of the night. Got up around 0200 agitated and trying to get out of bed. Assited pt to the bathroom with two other staff members on standby as pt resistive to assistance, swatting away staff members and refused her walker. Received one time order of IM zyprexa, however, did not  administer it since pt became less agitated after being offered one thing of ice cream and eating independently in bed. Pt did try to get up again after her ice cream but did not need to use the bathroom. Stood in the bathroom for about 5 minutes and then sat at edge of bed and finally laid back down to sleep. Pt seems to do better when not being continuously instructed where and what to do allowing pt some autonomy and being able to direct own cares as safe as the situation allows. NIHS 4, scoring for LOC and expressive aphasia. Intermittently following commands. Metoprolol given one time for HR >110; was not effective. HR has been in the 110-120's all night. Recent /72. Pt had one urinary incontinence, assisted with change of brief. Pt continues to be on 1:1. Pt has potential to be off 1:1 and being on very sensitive bed alarm.

## 2024-11-18 NOTE — PROGRESS NOTES
Care Management Follow Up    Length of Stay (days): 4    Expected Discharge Date: 11/18/2024     Concerns to be Addressed: Care progression - discharge planning     Patient plan of care discussed at interdisciplinary rounds: Yes    Anticipated Discharge Disposition: Rehab rec memory care, 24 supervision for safety and further PT for gait /transfers    Anticipated Discharge Services:  Rehab rec memory care, 24 supervision for safety and further PT for gait /transfers  Anticipated Discharge DME: NA     Patient/family educated on Medicare website which has current facility and service quality ratings:  NA  Education Provided on the Discharge Plan:  Yes per team  Patient/Family in Agreement with the Plan:  Yes    Referrals Placed by CM/SW:  NA  Private pay costs discussed: Not applicable    Discussed  Partnership in Safe Discharge Planning  document with patient/family: Yes: patient's daughter, Aimee    Handoff Completed: No, handoff not indicated or clinically appropriate    Additional Information:  0820 called [facility] and spoke with . Hipolito is not in the office yet. She will send him a message to return call.    Social Hx:  Assessment: Follow. On 1:1. Live alone in an apartment at [facility] [phone]. Needs assist w/ADLs and is dependent w/IADLs. Walk with no assistive device. Has cane. Has [facility] [place] worker, Valerie, with [facility] [phone]. Dtr Aimee primary contact.  Last note: 11/18/24  Plan: Rehab rec memory care, 24 supervision for safety and further PT for gait /transfers  Needs: NA  Transport: TBD     Next Steps: RNCM to follow for medical progression, recommendations, and final discharge plan.     Robyn Lockett RN     Per provider, Dr. ERMELINDA Dhillon, patient maybe ready today or tomorrow.   Called [facility] and spoke with Hipolito to update. Patient will need to be off the 1:1 before he can accept.    Met with patient and  family at bedside to discuss the above.

## 2024-11-18 NOTE — PROGRESS NOTES
1500 fall    1524- zyprexa    1800-Heart rates still in upper 110s.  /69.  Txt Hospitalist to see if Another PRN dose metoprolol IV or give HS dose early.  Response was to give PO early and recheck BP in about 30 minutes.

## 2024-11-18 NOTE — PLAN OF CARE
"  Problem: Adult Inpatient Plan of Care  Goal: Plan of Care Review  Description: The Plan of Care Review/Shift note should be completed every shift.  The Outcome Evaluation is a brief statement about your assessment that the patient is improving, declining, or no change.  This information will be displayed automatically on your shift  note.  11/17/2024 2239 by Juana Astudillo RN  Outcome: Progressing  11/17/2024 1428 by Juana Astudillo RN  Outcome: Progressing     Problem: Confusion Acute  Goal: Optimal Cognitive Function  11/17/2024 2239 by Juana Astudillo RN  Outcome: Progressing  11/17/2024 1428 by Juana Astudillo RN  Outcome: Progressing     Problem: Stroke, Ischemic (Includes Transient Ischemic Attack)  Goal: Optimal Coping  Outcome: Progressing  Goal: Effective Bowel Elimination  Outcome: Progressing   Goal Outcome Evaluation:       1600-Was called by 1:1 to come to room for assist.  Patient made her way to door and stated she wanted to leave.  Resistive to redirection.  She eventually went to sit and was lowered to the floor.  No injury.  Several staff assist into chair and was brought back into room.  PRN IM zyprexa given.   House Officer notified and new orders       1800-Heart rates still in upper 110s.  /69.  Txt Hospitalist to see if Another PRN dose metoprolol IV or give HS dose early.  Response was to give PO early and recheck BP.    1930- Patient resistive to BP check till now.  BP 89/54.  House Officer updated.    2020- Was called to room by 1:1.  Patient out of bed.  Was able to direct her to walk around bed and sit on edge of bed.  She was assist back in bed with 3 staff assist. She was not hitting out this time and was smiling and giggling when tucked in.  She was receptive to cares.  BP 93/67.  Heart rate 118 sinus tachy    2200-  patient resting/sleeping. HS meds given over a period of time due to frequent refusal.  She was pleasant about it saying \"no not now\" and holding hand " up. She eventually would take a single bite from time to time.

## 2024-11-18 NOTE — PLAN OF CARE
Problem: Confusion Acute  Goal: Optimal Cognitive Function  Outcome: Progressing  Intervention: Minimize Contributing Factors  Recent Flowsheet Documentation  Taken 11/18/2024 0820 by Virgen Costa, RN  Sensory Stimulation Regulation:   lighting decreased   care clustered   television on  Reorientation Measures: reorientation provided  Communication Support Strategies:   one-step directions provided   extra time allowed for response     Problem: Dysrhythmia  Goal: Normalized Cardiac Rhythm  Outcome: Progressing   Goal Outcome Evaluation:      Plan of Care Reviewed With: family, child     Patient confused, speech minimal, has has expressive and likely receptive aphasia dx stroke. Patient fed self with set up this am no signs or symptoms of aspiration. Patient used fingers to eat. Spit pills out if whole but ate crushed in ice cream. Patient very sleepy. When asked if she wants to get out of bed patient states no. Attempted to get patient up anyway's in attempt to make sure patient does not sleep all day so she will be able to sleep at night. Patient swatted at RN when trying to turn her to get her up. Placed patient on back and decided to try with [place]. Patient hoyered to chair with no issue seems comfortable. Daughter at bedside chair alarm on and 1;1 Aide near by

## 2024-11-18 NOTE — PROGRESS NOTES
[facility]    Medicine Progress Note - Hospitalist Service    Date of Admission:  11/14/2024    Assessment & Plan   Markus Berkowitz is a 89 year old female admitted on 11/14/2024. She has a history of dementia, hypothyroidism, HTN, CKD3a, depression, and anxiety and is admitted for acute ischemic stroke of left parietal lobe likely due to cardio embolism in the setting of new onset atrial fibrillation with rapid ventricular response.     Updates 11/18  - currently on 1:1 for agitation, will need to be off 1:1 for 24 hours prior to discharge  - anticipate discharge back to [facility] in the memory care unit     Acute Ischemic Left MCA stroke  Dysarthria  Came in with AMS. Nonsensical speech and dysarthria. CT Head showed Wedge-shaped focus of loss of gray-white matter differentiation left parietal lobe measuring 3.1 x 3 cm, concerning for an late acute/early subacute infarct. CTA notable for distal left M2 occlusion and 90% stenosis of left ICA. MRI w/ left MCA occlusion evidence. Suspect due to cardio embolism from atrial fibrillation vs plaque embolization from internal carotid. A1C 6.0. , total cholesterol 366.   - Neurology consulted, appreciate recs  - Neurochecks cancelled due to agitation and medical stability  - Permissive HTN; goal SBP < 220 mmHg   (Will need rate control for a fib, see below, which will slightly lower pressures)  - Atorvastatin 80mg daily  - Eliquis  - Telemetry cancelled due to agitation and medical stability   - PT/OT/SLP; recommended memory care at current facility   - Stroke Education  - Euthermia, Euglycemia     Atrial fibrillation with RVR  Intermittent conversions to sinus tachycardia  Atrial fibrillation with RVR upon arrival to the ED on telemetry. HR 130s. EKG confirmed. No ischemic changes, no prior to compare to. No known history of A-fib or heart failure. Initially, rate controlled in the ED with diltiazem, then placed on diltiazem gtt for  persistent RVR and was then transitioned to metoprolol; unfortunately, she refuses medications intermittently and developed hypotension with metoprolol. Echo with mild to moderate dilation of left atrium and mild decrease in right ventricular systolic function.   - Eliquis  - Start diltiazem 30 mg 4x daily; uptitrate as indicated   - diltiazem 30 mg q6 prn for HR > 120    Hypothyroidism   Patient has hx of hypothyroidism. PTA 75 mcg levothyroxine daily. TSH on admission elevated to 8.45. T4 0.73. Could consider increasing levothyroxine dose prior to discharge.   - PTA levothyroxine 75 mcg     Intermittent Agitation  Dementia   Anxiety/Depression  Multiple episodes during admission of agitation and aggression. Will minimize overnight interruptions and monitors as able.   - PTA donepezil, hydroxyzine, venlafaxine, mirtazapine  - Delirium precautions  - Redirect first if able  -Serouel 12.5mg one-time PRN if agitated/aggressive. Zyprexa 2.5 mg BID IM PRN if needed     CKD3a  MARIA ELENA   Patient's Cr 1.2 on admission, up from baseline of 1.1. Likely prerenal. S/p 500 mL bolus in ED.   - strict I&Os  - avoid nephrotoxic medications     HTN:   - hold PTA amlodipine 10 mg daily. Currently allowing permissive HTN, goal SBP < 220.     Goals of Care  Had care conference today. Proceed with DNR/DNI per POLST/Advanced directive.  On 11/16, attending Dr. Brooks discussed possibility of carotid endarterectomy with family.  Family is clear that this type of surgery would not be within patient's goals of care given her current status.  No surgery but otherwise full medical cares.  Son Eduardo is POA.           Diet: Combination Diet Easy to Chew (level 7); Thin Liquids (level 0); Thin Liquids (level 0)    DVT Prophylaxis: Enoxaparin (Lovenox) SQ  Griffiths Catheter: Not present  Lines: None     Cardiac Monitoring: None  Code Status: No CPR- Do NOT Intubate      Clinically Significant Risk Factors                              # Obesity:  "Estimated body mass index is 37.9 kg/m  as calculated from the following:    Height as of this encounter: 1.575 m (5' 2\").    Weight as of this encounter: 94 kg (207 lb 3.7 oz)., PRESENT ON ADMISSION     # Financial/Environmental Concerns: none         Social Drivers of Health    Interpersonal Safety: Unknown (11/16/2024)    Interpersonal Safety     Do you feel physically and emotionally safe where you currently live?: Patient unable to answer     Within the past 12 months, have you been hit, slapped, kicked or otherwise physically hurt by someone?: Patient unable to answer     Within the past 12 months, have you been humiliated or emotionally abused in other ways by your partner or ex-partner?: Patient unable to answer          Disposition Plan     Medically Ready for Discharge: Anticipated in 2-4 Days       The patient's care was discussed with the Attending Physician, Dr. Chichi MD .    Andrew Dhillon MD  Hospitalist Service  [facility]  Securely message with [employer] (more info)  Text page via [employer] Paging/Directory   ______________________________________________________________________    Interval History   Very pleasant on exam today. No acute overnight events.     Physical Exam   Vital Signs: Temp: 98.2  F (36.8  C) Temp src: Axillary BP: 106/86 Pulse: (!) 130   Resp: 18 SpO2: 92 % O2 Device: None (Room air)    Weight: 207 lbs 3.72 oz    General: Pleasant in bed today. Speech dysarthric  Skin: Warm, dry, intact no rashes or lesions.  HEENT: Normocephalic, atraumatic. Sclera non-icteric.   Cardiac: Appears well-perfused.   Respiratory: Normal work of breathing.   Extremities: No edema. Atraumatic.  Neurological: Alert, not oriented x4. Moving all 4 extremities, no facial droop, speech dysarthric.     "

## 2024-11-18 NOTE — PLAN OF CARE
Problem: Fall Injury Risk  Goal: Absence of Fall and Fall-Related Injury  Outcome: Progressing  Intervention: Identify and Manage Contributors  Recent Flowsheet Documentation  Taken 11/18/2024 1217 by Virgen Costa, RN  Medication Review/Management: medications reviewed  Intervention: Promote Injury-Free Environment  Recent Flowsheet Documentation  Taken 11/18/2024 1217 by Virgen Costa, RN  Safety Promotion/Fall Prevention:   nonskid shoes/slippers when out of bed   patient and family education   supervised activity   clutter free environment maintained   activity supervised   lighting adjusted   treat reversible contributory factors   Goal Outcome Evaluation:  Patient very high risk for falls. Patient does not follow commands and can become agitated and once up walking she can be difficult to redirect back to bed. Patient is very impulsive and can get out of bed quickly by herself. Intermittently refuses to take pills. Patient was up in chair for 2 hours but was mostly asleep in chair. Hoyered back to bed because patient refused to get up and walk back to bed but once in bed patient jumped out of bed and got her self to bathroom with aide and RN at ams length patient was refusing us holding her by belt.

## 2024-11-19 ENCOUNTER — APPOINTMENT (OUTPATIENT)
Dept: PHYSICAL THERAPY | Facility: HOSPITAL | Age: 89
End: 2024-11-19
Payer: MEDICARE

## 2024-11-19 ENCOUNTER — APPOINTMENT (OUTPATIENT)
Dept: SPEECH THERAPY | Facility: HOSPITAL | Age: 89
End: 2024-11-19
Payer: MEDICARE

## 2024-11-19 LAB
ANION GAP SERPL CALCULATED.3IONS-SCNC: 9 MMOL/L (ref 7–15)
BUN SERPL-MCNC: 19.2 MG/DL (ref 8–23)
CALCIUM SERPL-MCNC: 8.9 MG/DL (ref 8.8–10.4)
CHLORIDE SERPL-SCNC: 106 MMOL/L (ref 98–107)
CREAT SERPL-MCNC: 0.98 MG/DL (ref 0.51–0.95)
EGFRCR SERPLBLD CKD-EPI 2021: 54 ML/MIN/1.73M2
ERYTHROCYTE [DISTWIDTH] IN BLOOD BY AUTOMATED COUNT: 13.5 % (ref 10–15)
GLUCOSE SERPL-MCNC: 117 MG/DL (ref 70–99)
HCO3 SERPL-SCNC: 25 MMOL/L (ref 22–29)
HCT VFR BLD AUTO: 42.3 % (ref 35–47)
HGB BLD-MCNC: 13.9 G/DL (ref 11.7–15.7)
MAGNESIUM SERPL-MCNC: 2 MG/DL (ref 1.7–2.3)
MCH RBC QN AUTO: 31.3 PG (ref 26.5–33)
MCHC RBC AUTO-ENTMCNC: 32.9 G/DL (ref 31.5–36.5)
MCV RBC AUTO: 95 FL (ref 78–100)
PHOSPHATE SERPL-MCNC: 4 MG/DL (ref 2.5–4.5)
PLATELET # BLD AUTO: 251 10E3/UL (ref 150–450)
POTASSIUM SERPL-SCNC: 3.7 MMOL/L (ref 3.4–5.3)
RBC # BLD AUTO: 4.44 10E6/UL (ref 3.8–5.2)
SODIUM SERPL-SCNC: 140 MMOL/L (ref 135–145)
WBC # BLD AUTO: 6.1 10E3/UL (ref 4–11)

## 2024-11-19 PROCEDURE — 120N000004 HC R&B MS OVERFLOW

## 2024-11-19 PROCEDURE — 250N000013 HC RX MED GY IP 250 OP 250 PS 637

## 2024-11-19 PROCEDURE — 82310 ASSAY OF CALCIUM: CPT

## 2024-11-19 PROCEDURE — 97110 THERAPEUTIC EXERCISES: CPT | Mod: GP

## 2024-11-19 PROCEDURE — 82565 ASSAY OF CREATININE: CPT

## 2024-11-19 PROCEDURE — 92507 TX SP LANG VOICE COMM INDIV: CPT | Mod: GN

## 2024-11-19 PROCEDURE — 92526 ORAL FUNCTION THERAPY: CPT | Mod: GN

## 2024-11-19 PROCEDURE — 36415 COLL VENOUS BLD VENIPUNCTURE: CPT

## 2024-11-19 PROCEDURE — 93010 ELECTROCARDIOGRAM REPORT: CPT | Mod: HIP | Performed by: STUDENT IN AN ORGANIZED HEALTH CARE EDUCATION/TRAINING PROGRAM

## 2024-11-19 PROCEDURE — 84100 ASSAY OF PHOSPHORUS: CPT | Performed by: FAMILY MEDICINE

## 2024-11-19 PROCEDURE — 85027 COMPLETE CBC AUTOMATED: CPT

## 2024-11-19 PROCEDURE — 97116 GAIT TRAINING THERAPY: CPT | Mod: GP

## 2024-11-19 PROCEDURE — 83735 ASSAY OF MAGNESIUM: CPT | Performed by: FAMILY MEDICINE

## 2024-11-19 PROCEDURE — 80048 BASIC METABOLIC PNL TOTAL CA: CPT

## 2024-11-19 PROCEDURE — 93005 ELECTROCARDIOGRAM TRACING: CPT

## 2024-11-19 PROCEDURE — 99232 SBSQ HOSP IP/OBS MODERATE 35: CPT | Mod: GC | Performed by: FAMILY MEDICINE

## 2024-11-19 PROCEDURE — 250N000011 HC RX IP 250 OP 636

## 2024-11-19 RX ORDER — DILTIAZEM HYDROCHLORIDE 5 MG/ML
20 INJECTION INTRAVENOUS ONCE
Status: COMPLETED | OUTPATIENT
Start: 2024-11-19 | End: 2024-11-19

## 2024-11-19 RX ORDER — DILTIAZEM HYDROCHLORIDE 120 MG/1
120 CAPSULE, COATED, EXTENDED RELEASE ORAL DAILY
Status: DISCONTINUED | OUTPATIENT
Start: 2024-11-19 | End: 2024-11-20 | Stop reason: HOSPADM

## 2024-11-19 RX ADMIN — APIXABAN 5 MG: 5 TABLET, FILM COATED ORAL at 12:02

## 2024-11-19 RX ADMIN — LEVOTHYROXINE SODIUM 75 MCG: 0.03 TABLET ORAL at 12:01

## 2024-11-19 RX ADMIN — MIRTAZAPINE 30 MG: 7.5 TABLET, FILM COATED ORAL at 21:32

## 2024-11-19 RX ADMIN — APIXABAN 5 MG: 5 TABLET, FILM COATED ORAL at 21:33

## 2024-11-19 RX ADMIN — DILTIAZEM HYDROCHLORIDE 30 MG: 30 TABLET, FILM COATED ORAL at 12:02

## 2024-11-19 RX ADMIN — MICONAZOLE NITRATE ANTIFUNGAL POWDER: 2 POWDER TOPICAL at 21:47

## 2024-11-19 RX ADMIN — DILTIAZEM HYDROCHLORIDE 120 MG: 120 CAPSULE, EXTENDED RELEASE ORAL at 15:54

## 2024-11-19 RX ADMIN — VENLAFAXINE HYDROCHLORIDE 150 MG: 150 CAPSULE, EXTENDED RELEASE ORAL at 12:00

## 2024-11-19 RX ADMIN — DONEPEZIL HYDROCHLORIDE 10 MG: 5 TABLET, FILM COATED ORAL at 21:33

## 2024-11-19 RX ADMIN — ATORVASTATIN CALCIUM 80 MG: 40 TABLET, FILM COATED ORAL at 21:33

## 2024-11-19 RX ADMIN — BUSPIRONE HYDROCHLORIDE 10 MG: 10 TABLET ORAL at 21:33

## 2024-11-19 RX ADMIN — BUSPIRONE HYDROCHLORIDE 10 MG: 10 TABLET ORAL at 12:01

## 2024-11-19 RX ADMIN — MICONAZOLE NITRATE ANTIFUNGAL POWDER: 2 POWDER TOPICAL at 12:17

## 2024-11-19 RX ADMIN — QUETIAPINE FUMARATE 12.5 MG: 25 TABLET ORAL at 21:33

## 2024-11-19 RX ADMIN — DILTIAZEM HYDROCHLORIDE 20 MG: 5 INJECTION, SOLUTION INTRAVENOUS at 20:51

## 2024-11-19 NOTE — PROGRESS NOTES
SPIRITUAL HEALTH SERVICES Progress Note  [facility] [place], P3    Saw pt Markus F[place] Woo per length of stay assessment. Markus not able to engage in conversation at this time. Offered compassionate presence and well wishes. Her chart reflects [employer] spiritual/[employer] background.     Plan of Care - A  will continue to visit as able or per request by patient/family/staff.      Shay Haynes MDiv, [facility]  /Manager Spiritual Health Services  [phone]       Spiritual Health Services is available 24/7 for emergent requests and consults, either by paging the on-call  or by entering an ASAP/STAT consult in [employer], which will also page the on-call .

## 2024-11-19 NOTE — PLAN OF CARE
Goal Outcome Evaluation:       Pt in uncontrolled Afib(HR up to 130's) in am. Refused to take medications, eat breakfast or sit up in chair. Dr Gonzalez here to see pt and aware.Did not want to give any IV medication for HR at that time. Pt oriented only to self this am. Family here at lunchtime. Pt up in chair. Took meds crushed in pudding, ate some lunch and pleasant and cooperative. HR down to 90's when resting.                 Problem: Dysrhythmia  Goal: Normalized Cardiac Rhythm  Outcome: Progressing     Problem: Confusion Acute  Goal: Optimal Cognitive Function  Outcome: Progressing  Intervention: Minimize Contributing Factors  Recent Flowsheet Documentation  Taken 11/19/2024 4645 by Nicolle Colin, RN  Sensory Stimulation Regulation:   lighting decreased   quiet environment promoted  Reorientation Measures:   reorientation provided   hearing device use encouraged   glasses use encouraged  Communication Support Strategies:   one-step directions provided   nonverbal strategies used

## 2024-11-19 NOTE — PLAN OF CARE
Problem: Confusion Acute  Goal: Optimal Cognitive Function  Outcome: Progressing     Problem: Dysrhythmia  Goal: Normalized Cardiac Rhythm  Intervention: Monitor and Manage Cardiac Rhythm Effect  Recent Flowsheet Documentation  Taken 11/19/2024 1600 by Melania Pepper RN  VTE Prevention/Management: SCDs off (sequential compression devices)   Goal Outcome Evaluation:      Plan of Care Reviewed With: patient    Overall Patient Progress: improvingOverall Patient Progress: improving     Pt disorientatedx4, on RA, afib, denies pain, SBA w/ walker.     Mg, k, ph protocol all recheck in AM.

## 2024-11-19 NOTE — PROGRESS NOTES
Care Management Follow Up    Length of Stay (days): 5    Expected Discharge Date: 11/20/2024    Anticipated Discharge Plan:       Transportation: TBD    PT Recommendations: other (see comments) (Memory care - 24/7 supervision and assistx1 for mobility, and home PT)  OT Recommendations:  other (see comments) (memory care)     Barriers to Discharge:  off 1:1 for 24 hours    Prior Living Situation: Live alone in an apartment at [facility] [phone]. Needs assist w/ADLs and is dependent w/IADLs. Walk with no assistive device. Has cane. Has Elderly [facility] worker, Valerie, with [facility] [phone]. Dtr Aimee primary contact.     Discussed  Partnership in Safe Discharge Planning  document with patient/family: No     Handoff Completed: No, handoff not indicated or clinically appropriate    Patient/Spokesperson Updated: No    Additional Information:  Pt off 1:1 at of 11/19 at 12am. CM to follow up with pt's LILIYA once pt is off 1:1 for 24 hours.    Next Steps: update [facility] tomorrow that pt is off 1:1 for 24 hours    Nicky Joseph, LATOSHASW

## 2024-11-19 NOTE — PLAN OF CARE
Problem: Dysrhythmia  Goal: Normalized Cardiac Rhythm  11/19/2024 0305 by Matilde Morrison, RN  Outcome: Progressing  11/18/2024 2127 by Matilde Morrison, RN  Outcome: Progressing   Goal Outcome Evaluation:    Up SBA to the bathroom. Oriented x1. All medications taken. HR Afib 90's-120's at times. Did not sustain enough for PRN diltiazem. Has been impulsive but pleasant and easily redirected.

## 2024-11-19 NOTE — PROGRESS NOTES
[facility]    Medicine Progress Note - Hospitalist Service    Date of Admission:  11/14/2024    Assessment & Plan   Markus Berkowitz is a 89 year old female admitted on 11/14/2024. She has a history of dementia, hypothyroidism, HTN, CKD3a, depression, and anxiety and is admitted for acute ischemic stroke of left parietal lobe likely due to cardio embolism in the setting of new onset atrial fibrillation with rapid ventricular response.     Updates 11/19  - Started diltiazem extended release 120 mg  - Off 1:1 since yesterday afternoon; requires 24 hours off before discharging per jail  - anticipate discharge back to [facility] in the memory care unit tomorrow     Acute Ischemic Left MCA stroke  Dysarthria  Came in with AMS. Nonsensical speech and dysarthria. CT Head showed Wedge-shaped focus of loss of gray-white matter differentiation left parietal lobe measuring 3.1 x 3 cm, concerning for an late acute/early subacute infarct. CTA notable for distal left M2 occlusion and 90% stenosis of left ICA. MRI w/ left MCA occlusion evidence. Suspect due to cardio embolism from atrial fibrillation vs plaque embolization from internal carotid. A1C 6.0. , total cholesterol 366.   - Neurology consulted, appreciate recs  - Neurochecks cancelled due to agitation and medical stability  - Permissive HTN; goal SBP < 220 mmHg   (Will need rate control for a fib, see below, which will slightly lower pressures)  - Atorvastatin 80mg daily  - Eliquis  - Telemetry cancelled due to agitation and medical stability   - PT/OT/SLP; recommended memory care at current facility   - Stroke Education  - Euthermia, Euglycemia     Atrial fibrillation with RVR  Intermittent conversions to sinus tachycardia  Atrial fibrillation with RVR upon arrival to the ED on telemetry. HR 130s. EKG confirmed. No ischemic changes, no prior to compare to. No known history of A-fib or heart failure. Initially, rate controlled in the ED  with diltiazem, then placed on diltiazem gtt for persistent RVR and was then transitioned to metoprolol; unfortunately, she refuses medications intermittently and developed hypotension with metoprolol. Echo with mild to moderate dilation of left atrium and mild decrease in right ventricular systolic function.   - Eliquis  - Start diltiazem 120 mg extended release today     Hypothyroidism   Patient has hx of hypothyroidism. PTA 75 mcg levothyroxine daily. TSH on admission elevated to 8.45. T4 0.73. Could consider increasing levothyroxine dose prior to discharge.   - PTA levothyroxine 75 mcg     Intermittent Agitation  Dementia   Anxiety/Depression  Multiple episodes during admission of agitation and aggression. Will minimize overnight interruptions and monitors as able.   - PTA donepezil, hydroxyzine, venlafaxine, mirtazapine  - Delirium precautions  - Redirect first if able  -Serouel 12.5mg one-time PRN if agitated/aggressive. Zyprexa 2.5 mg BID IM PRN if needed     CKD3a  MARIA ELENA   Patient's Cr 1.2 on admission, up from baseline of 1.1. Likely prerenal. S/p 500 mL bolus in ED.   - strict I&Os  - avoid nephrotoxic medications     HTN:   - hold PTA amlodipine 10 mg daily. Currently allowing permissive HTN, goal SBP < 220.     Goals of Care  Had care conference today. Proceed with DNR/DNI per POLST/Advanced directive.  On 11/16, attending Dr. Brooks discussed possibility of carotid endarterectomy with family.  Family is clear that this type of surgery would not be within patient's goals of care given her current status.  No surgery but otherwise full medical cares.  Son Eduardo is POA.        Diet: Combination Diet Easy to Chew (level 7); Thin Liquids (level 0); Thin Liquids (level 0)    DVT Prophylaxis: Enoxaparin (Lovenox) SQ  Griffiths Catheter: Not present  Lines: None     Cardiac Monitoring: None  Code Status: No CPR- Do NOT Intubate      Clinically Significant Risk Factors                              # Obesity:  "Estimated body mass index is 36.16 kg/m  as calculated from the following:    Height as of this encounter: 1.575 m (5' 2\").    Weight as of this encounter: 89.7 kg (197 lb 11.2 oz).      # Financial/Environmental Concerns: none         Social Drivers of Health    Food Insecurity: Unknown (11/18/2024)    Food Insecurity     Within the past 12 months, did you worry that your food would run out before you got money to buy more?: Patient unable to answer     Within the past 12 months, did the food you bought just not last and you didn t have money to get more?: Patient unable to answer   Housing Stability: Unknown (11/18/2024)    Housing Stability     Do you have housing? : Patient unable to answer     Are you worried about losing your housing?: Patient unable to answer   Financial Resource Strain: Unknown (11/18/2024)    Financial Resource Strain     Within the past 12 months, have you or your family members you live with been unable to get utilities (heat, electricity) when it was really needed?: Patient unable to answer   Transportation Needs: Unknown (11/18/2024)    Transportation Needs     Within the past 12 months, has lack of transportation kept you from medical appointments, getting your medicines, non-medical meetings or appointments, work, or from getting things that you need?: Patient unable to answer   Interpersonal Safety: Unknown (11/16/2024)    Interpersonal Safety     Do you feel physically and emotionally safe where you currently live?: Patient unable to answer     Within the past 12 months, have you been hit, slapped, kicked or otherwise physically hurt by someone?: Patient unable to answer     Within the past 12 months, have you been humiliated or emotionally abused in other ways by your partner or ex-partner?: Patient unable to answer          Disposition Plan     Medically Ready for Discharge: Anticipate tomorrow        The patient's care was discussed with the Attending Physician, Dr. Chichi MD " .    Andrew Dhillon MD  [facility], PGY-2    ______________________________________________________________________    Interval History   Very pleasant on exam today. No acute overnight events. Continues to intermittently refuse meds.     Physical Exam   Vital Signs: Temp: 97.8  F (36.6  C) Temp src: Oral BP: 124/72 Pulse: (!) 128   Resp: 18 SpO2: 93 % O2 Device: None (Room air)    Weight: 197 lbs 11.2 oz    General: Pleasant in bed today. Speech dysarthric  Skin: Warm, dry, intact no rashes or lesions.  HEENT: Normocephalic, atraumatic. Sclera non-icteric.   Cardiac: Appears well-perfused.   Respiratory: Normal work of breathing.   Extremities: No edema. Atraumatic.  Neurological: Alert, not oriented x4. Moving all 4 extremities, no facial droop, speech dysarthric.

## 2024-11-20 ENCOUNTER — APPOINTMENT (OUTPATIENT)
Dept: OCCUPATIONAL THERAPY | Facility: HOSPITAL | Age: 89
DRG: 064 | End: 2024-11-20
Payer: MEDICARE

## 2024-11-20 VITALS
WEIGHT: 198 LBS | RESPIRATION RATE: 18 BRPM | OXYGEN SATURATION: 91 % | SYSTOLIC BLOOD PRESSURE: 130 MMHG | BODY MASS INDEX: 36.44 KG/M2 | DIASTOLIC BLOOD PRESSURE: 74 MMHG | TEMPERATURE: 98.2 F | HEART RATE: 136 BPM | HEIGHT: 62 IN

## 2024-11-20 LAB
ANION GAP SERPL CALCULATED.3IONS-SCNC: 9 MMOL/L (ref 7–15)
ATRIAL RATE - MUSE: 357 BPM
BUN SERPL-MCNC: 14.4 MG/DL (ref 8–23)
CALCIUM SERPL-MCNC: 8.8 MG/DL (ref 8.8–10.4)
CHLORIDE SERPL-SCNC: 108 MMOL/L (ref 98–107)
CREAT SERPL-MCNC: 0.96 MG/DL (ref 0.51–0.95)
DIASTOLIC BLOOD PRESSURE - MUSE: NORMAL MMHG
EGFRCR SERPLBLD CKD-EPI 2021: 56 ML/MIN/1.73M2
ERYTHROCYTE [DISTWIDTH] IN BLOOD BY AUTOMATED COUNT: 13.4 % (ref 10–15)
GLUCOSE SERPL-MCNC: 109 MG/DL (ref 70–99)
HCO3 SERPL-SCNC: 27 MMOL/L (ref 22–29)
HCT VFR BLD AUTO: 40.8 % (ref 35–47)
HGB BLD-MCNC: 13.7 G/DL (ref 11.7–15.7)
INTERPRETATION ECG - MUSE: NORMAL
MAGNESIUM SERPL-MCNC: 1.9 MG/DL (ref 1.7–2.3)
MCH RBC QN AUTO: 31.7 PG (ref 26.5–33)
MCHC RBC AUTO-ENTMCNC: 33.6 G/DL (ref 31.5–36.5)
MCV RBC AUTO: 94 FL (ref 78–100)
P AXIS - MUSE: NORMAL DEGREES
PHOSPHATE SERPL-MCNC: 3.2 MG/DL (ref 2.5–4.5)
PLATELET # BLD AUTO: 266 10E3/UL (ref 150–450)
POTASSIUM SERPL-SCNC: 3.8 MMOL/L (ref 3.4–5.3)
PR INTERVAL - MUSE: NORMAL MS
QRS DURATION - MUSE: 88 MS
QT - MUSE: 304 MS
QTC - MUSE: 460 MS
R AXIS - MUSE: 43 DEGREES
RBC # BLD AUTO: 4.32 10E6/UL (ref 3.8–5.2)
SODIUM SERPL-SCNC: 144 MMOL/L (ref 135–145)
SYSTOLIC BLOOD PRESSURE - MUSE: NORMAL MMHG
T AXIS - MUSE: 102 DEGREES
VENTRICULAR RATE- MUSE: 138 BPM
WBC # BLD AUTO: 4.7 10E3/UL (ref 4–11)

## 2024-11-20 PROCEDURE — 97535 SELF CARE MNGMENT TRAINING: CPT | Mod: GO

## 2024-11-20 PROCEDURE — 250N000013 HC RX MED GY IP 250 OP 250 PS 637

## 2024-11-20 PROCEDURE — 85041 AUTOMATED RBC COUNT: CPT

## 2024-11-20 PROCEDURE — 250N000011 HC RX IP 250 OP 636

## 2024-11-20 PROCEDURE — 36415 COLL VENOUS BLD VENIPUNCTURE: CPT

## 2024-11-20 PROCEDURE — 83735 ASSAY OF MAGNESIUM: CPT

## 2024-11-20 PROCEDURE — 99238 HOSP IP/OBS DSCHRG MGMT 30/<: CPT | Mod: GC | Performed by: FAMILY MEDICINE

## 2024-11-20 PROCEDURE — 84100 ASSAY OF PHOSPHORUS: CPT

## 2024-11-20 PROCEDURE — 82565 ASSAY OF CREATININE: CPT

## 2024-11-20 PROCEDURE — 97110 THERAPEUTIC EXERCISES: CPT | Mod: GO

## 2024-11-20 PROCEDURE — 85018 HEMOGLOBIN: CPT

## 2024-11-20 PROCEDURE — 80048 BASIC METABOLIC PNL TOTAL CA: CPT

## 2024-11-20 RX ORDER — ATORVASTATIN CALCIUM 80 MG/1
80 TABLET, FILM COATED ORAL EVERY EVENING
Qty: 90 TABLET | Refills: 0 | Status: SHIPPED | OUTPATIENT
Start: 2024-11-20

## 2024-11-20 RX ORDER — DILTIAZEM HYDROCHLORIDE 5 MG/ML
20 INJECTION INTRAVENOUS ONCE
Status: COMPLETED | OUTPATIENT
Start: 2024-11-20 | End: 2024-11-20

## 2024-11-20 RX ORDER — DILTIAZEM HYDROCHLORIDE 120 MG/1
240 CAPSULE, COATED, EXTENDED RELEASE ORAL DAILY
Qty: 90 CAPSULE | Refills: 0 | Status: SHIPPED | OUTPATIENT
Start: 2024-11-21

## 2024-11-20 RX ADMIN — DILTIAZEM HYDROCHLORIDE 20 MG: 5 INJECTION INTRAVENOUS at 06:24

## 2024-11-20 RX ADMIN — BUSPIRONE HYDROCHLORIDE 10 MG: 10 TABLET ORAL at 09:06

## 2024-11-20 RX ADMIN — MICONAZOLE NITRATE ANTIFUNGAL POWDER: 2 POWDER TOPICAL at 11:29

## 2024-11-20 RX ADMIN — APIXABAN 5 MG: 5 TABLET, FILM COATED ORAL at 09:06

## 2024-11-20 RX ADMIN — DONEPEZIL HYDROCHLORIDE 10 MG: 5 TABLET, FILM COATED ORAL at 11:30

## 2024-11-20 RX ADMIN — VENLAFAXINE HYDROCHLORIDE 150 MG: 150 CAPSULE, EXTENDED RELEASE ORAL at 09:06

## 2024-11-20 RX ADMIN — DILTIAZEM HYDROCHLORIDE 120 MG: 120 CAPSULE, EXTENDED RELEASE ORAL at 09:05

## 2024-11-20 RX ADMIN — LEVOTHYROXINE SODIUM 75 MCG: 0.03 TABLET ORAL at 09:06

## 2024-11-20 ASSESSMENT — ACTIVITIES OF DAILY LIVING (ADL)
ADLS_ACUITY_SCORE: 0

## 2024-11-20 NOTE — SIGNIFICANT EVENT
Significant Event Note    Time of event: 5:55 AM November 20, 2024    Description of event:  RN paged about HR frequently in 140s. Can go up to 150s with activity.     Earlier this evening had IV diltiazem bolus 20 mg with good response and pulse rate 100-110. Of note patient recently started on diltiazem 120 XR. Would like to avoid metoprolol given hypotension with it during this admission.     Plan:  Second bolus of 20 mg diltiazem. If responding could consider increasing diltiazem dose. If uncontrolled rate despite bolusing will start diltiazem gtt.   Morning labs are unremarkable, with normal K, Mag and Hgb.     Discussed with: bedside nurse    Joey Conteh MD

## 2024-11-20 NOTE — PLAN OF CARE
Problem: Confusion Acute  Goal: Optimal Cognitive Function  Outcome: Progressing  Intervention: Minimize Contributing Factors  Recent Flowsheet Documentation  Taken 11/20/2024 0931 by Melania Pepper RN  Reorientation Measures: reorientation provided  Communication Support Strategies: one-step directions provided     Problem: Stroke, Ischemic (Includes Transient Ischemic Attack)  Goal: Optimal Functional Ability  Outcome: Progressing   Goal Outcome Evaluation:      Plan of Care Reviewed With: patient    Overall Patient Progress: improvingOverall Patient Progress: improving     Pt disorientated x4, on RA, denies pain, SBA w/ walker and gait belt, AFIB RVR. Pt took medications whole with water or crushed in ice cream.     Pt's daughter visited at bedside today. Plan to discharge to long term care with family as transport. Pt left unit around 1330.       Melania Pepper, RN

## 2024-11-20 NOTE — PLAN OF CARE
Occupational Therapy Discharge Summary    Reason for therapy discharge:    Discharged to home.    Progress towards therapy goal(s). See goals on Care Plan in [facility] electronic health record for goal details.  Goals not met.  Barriers to achieving goals:   discharge from facility.    Therapy recommendation(s):    No further therapy is recommended.Pt is returning to Memory Care w/continued assist/supervision for all tasks

## 2024-11-20 NOTE — DISCHARGE SUMMARY
"[facility]  Discharge Summary - Medicine & Pediatrics       Date of Admission:  11/14/2024  Date of Discharge:  11/20/2024  Discharging Provider: Andrew Dhillon MD  Discharge Service: Hospitalist Service    Discharge Diagnoses   Acute ischemic left MCA stroke   Dysarthria   Atrial fibrillation with RVR    Clinically Significant Risk Factors     # Obesity: Estimated body mass index is 36.21 kg/m  as calculated from the following:    Height as of this encounter: 1.575 m (5' 2\").    Weight as of this encounter: 89.8 kg (198 lb).     Clinically Significant Risk Factors          # Hyperchloremia: Highest Cl = 108 mmol/L in last 2 days, will monitor as appropriate                         # Obesity: Estimated body mass index is 36.21 kg/m  as calculated from the following:    Height as of this encounter: 1.575 m (5' 2\").    Weight as of this encounter: 89.8 kg (198 lb).      # Financial/Environmental Concerns: none         Follow-ups Needed After Discharge   Follow-up Appointments       Hospital Follow-up with Existing Primary Care Provider (PCP)      Please see details below         Schedule Primary Care visit within: 7 Days               Discharge Disposition   Discharged to home  Condition at discharge: Stable    Hospital Course   Markus Berkowitz was admitted on 11/14/2024 for acute ischemic stroke of the left parietal lobe, likely in the setting of new a fib.  The following problems were addressed during her hospitalization:    Acute ischemic left MCA stroke   Dysarthria   Initially presented with altered mental status, nonsensical speech, and dysarthria. Last known well was 6 days prior to presentation. Underwent head CT and CTA showing wedge-shaped focus of loss of gray-white matter differentiation left parietal lobe measuring 3.1 x 3 cm, concerning for an late acute/early subacute infarct. CTA notable for distal left M2 occlusion and 90% stenosis of left ICA. MRI w/ left MCA occlusion " evidence. Likely cardioembolic in the setting of new atrial fibrillation. Neurology was consulted and recommended routine post-stroke cares. She was started on atorvastatin 80 mg, apixiban 5 mg twice daily, and continued aspirin daily. Currently living at [facility] in [place] and will be discharged back to her facility but into the memory care unit. She continues to have significant expressive and receptive aphasia, although no motor deficits.   - continue atorvastatin 80 mg   - continue apixiban 5 mg twice daily   - continue aspirin 325 mg   - stopped PTA amlodipine 10 mg for permissive hypertension. Likely can discontinue outpatient.     Atrial fibrillation with RVR  New diagnosis, likely the source of cardioembolism. Rates and blood pressure have been difficult to control while inpatient and complicated by refusing medications. Tolerating diltiazem 120 mg well but rates still labile in the 's while inpatient. Will increase to 240 mg extended release on day of discharge.   - Diltiazem 240 mg ER daily  - Apixiban 5 mg BID     Hospital acquired delirium   Dementia   Frequent episodes of increased agitation and combativeness, likely multifactorial in the setting of known dementia and post-stroke. Required intermittent doses of IM zyprexa and 1:1 sitter but has now resolved on day of discharge.     CKD3a  Creatinine elevated on admission, resolved with IV fluids. Now back at baseline.     Consultations This Hospital Stay   NEUROLOGY IP STROKE CONSULT  SPEECH LANGUAGE PATH ADULT IP CONSULT  PHARMACY IP CONSULT  PHARMACY IP CONSULT  PHARMACY IP CONSULT  PHYSICAL THERAPY ADULT IP CONSULT  OCCUPATIONAL THERAPY ADULT IP CONSULT  REHAB ADMISSIONS LIAISON IP CONSULT  CARE MANAGEMENT / SOCIAL WORK IP CONSULT  NEUROLOGY IP CONSULT  PHARMACY LIAISON FOR MEDICATION COVERAGE CONSULT  PHARMACY IP CONSULT    Code Status   No CPR- Do NOT Intubate       The patient was discussed with MD Andrew Gonzalez,  [facility], PGY-2    ______________________________________________________________________    Physical Exam   Vital Signs: Temp: 98.2  F (36.8  C) Temp src: Oral BP: 130/74 Pulse: (!) 136   Resp: 18 SpO2: 91 % O2 Device: None (Room air)    Weight: 198 lbs 0 oz    General: Pleasant in bed today. Speech dysarthric  Skin: Warm, dry, intact no rashes or lesions.  HEENT: Normocephalic, atraumatic. Sclera non-icteric.   Cardiac: Appears well-perfused.   Respiratory: Normal work of breathing.   Extremities: No edema. Atraumatic.  Neurological: Alert, not oriented x4. Moving all 4 extremities, no facial droop, speech dysarthric.       Primary Care Physician   [facility]    Discharge Orders      Reason for your hospital stay    You were hospitalized for acute stroke and atrial fibrillation.     Activity    Your activity upon discharge: activity as tolerated     Diet    Follow this diet upon discharge: Current Diet:Orders Placed This Encounter      Combination Diet Easy to Chew (level 7); Thin Liquids (level 0); Thin Liquids (level 0)     Hospital Follow-up with Existing Primary Care Provider (PCP)    Please see details below            Significant Results and Procedures   Most Recent 3 CBC's:  Recent Labs   Lab Test 11/20/24  0515 11/19/24  0440 11/18/24  0442   WBC 4.7 6.1 7.8   HGB 13.7 13.9 14.2   MCV 94 95 95    251 263     Most Recent 3 BMP's:  Recent Labs   Lab Test 11/20/24  0515 11/19/24  0440 11/18/24  0442    140 140   POTASSIUM 3.8 3.7 3.8   CHLORIDE 108* 106 105   CO2 27 25 24   BUN 14.4 19.2 21.4   CR 0.96* 0.98* 1.08*   ANIONGAP 9 9 11   ALICIA 8.8 8.9 9.4   * 117* 140*   ,   Results for orders placed or performed during the hospital encounter of 11/14/24   Head CT w/o contrast    Narrative    EXAM: CT HEAD W/O CONTRAST  LOCATION: [facility]  DATE: 11/14/2024    INDICATION: Increased confusion  COMPARISON:  None.  TECHNIQUE: Routine CT Head without IV contrast. Multiplanar reformats. Dose reduction techniques were used.    FINDINGS:  INTRACRANIAL CONTENTS: No evidence of acute intracranial hemorrhage or mass effect. Wedge-shaped focus of loss of gray-white matter differentiation left parietal lobe measuring 3.1 x 3 cm, concerning for an late acute/early subacute infarct. Scattered   foci of decreased attenuation within the cerebral hemispheric white matter which are nonspecific, though most commonly ascribed to small vessel ischemic disease. The ventricles are sulci are prominent consistent with moderate brain parenchymal volume   loss. The basilar cisterns are patent.    VISUALIZED ORBITS/SINUSES/MASTOIDS: The globes are unremarkable. The partially imaged paranasal sinuses, mastoid air cells and middle ear cavities are unremarkable.     BONES/SOFT TISSUES: The visualized skull base and calvarium are unremarkable.      Impression    IMPRESSION:    1.  Wedge-shaped focus of loss of gray-white matter differentiation left parietal lobe measuring 3.1 x 3 cm, concerning for an late acute/early subacute infarct.  2.  No evidence of acute cranial hemorrhage or mass effect.  3.  Moderate brain parenchymal volume loss.   CTA Head Neck with Contrast    Narrative    EXAM: CTA HEAD NECK W CONTRAST  LOCATION: [facility]  DATE: 11/14/2024    INDICATION: recent stroke  COMPARISON: None.  CONTRAST: 75 mL [id number]  TECHNIQUE: Head and neck CT angiogram with IV contrast. Axial helical CT images of the head and neck vessels obtained during the arterial phase of intravenous contrast administration. Axial 2D reconstructed images and multiplanar 3D MIP reconstructed   images of the head and neck vessels were performed by the technologist. Dose reduction techniques were used. All stenosis measurements made according to NASCET criteria unless otherwise specified.    FINDINGS:   HEAD CTA:  Examination of the anterior  circulation demonstrates flow within the bilateral internal carotid and proximal aspects of the anterior and middle cerebral arteries. Occlusion of the distal M2/proximal M3 branch of the left middle cerebral artery.    Examination of the posterior circulation demonstrates flow within the distal vertebral, basilar, and proximal aspects of the posterior cerebral arteries. The right and left posterior communicating arteries are not demonstrated with certainty. Moderate   stenosis of the P1/P2 branch of the left posterior cerebral artery.    No other evidence of pathologic vascular occlusion or saccular aneurysm within the visualized cranial circulation by CT angiography.    NECK CTA:  The aortic arch has normal branching configuration. There is flow within the brachiocephalic, common carotid, and proximal aspects of the subclavian arteries.    The right common carotid artery is patent. Examination of the right carotid bulb demonstrates no evidence of hemodynamically significant stenosis within the right internal carotid artery within the neck.    The left common carotid artery is patent. Examination of the left carotid bulb demonstrates calcified and atherosclerotic plaque with approximately 85-90% stenosis of the carotid artery origin.    The right and left vertebral arteries are patent.    No evidence of arterial dissection.    The parotid, submandibular and thyroid glands are unremarkable.    No evidence of cervical lymphadenopathy by size or morphologic criteria.    The partially imaged lung apices are unremarkable.    No suspicious osseous lesions.      Impression    IMPRESSION:   HEAD CTA:   1.  Occlusion of the distal M2/proximal M3 branch of the left middle cerebral artery.  2.  Moderate stenosis of the P1/P2 branch of the left posterior cerebral artery.  3.  No other evidence of pathologic vascular occlusion or saccular aneurysm within the visualized intracranial circulation by CT angiography.    NECK  CTA:  1.  Greater than 85-90% stenosis of the left internal carotid artery or origin from calcified plaque.  2.  No evidence of hemodynamically significant stenosis within the right internal carotid artery within the neck.    Dr. Mnedez discussed the results with Dr. Das on 11/14/2024 4:07 PM CST by telephone.    MR Brain w/o & w Contrast    Narrative    EXAM: MR BRAIN W/O and W CONTRAST  LOCATION: [facility]  DATE: 11/14/2024    INDICATION: Acute ischemic left parietal stroke  COMPARISON:  Same day CT and CTA.  CONTRAST: 9.5 mL Gadavist  TECHNIQUE: Routine multiplanar multisequence head MRI without and with intravenous contrast.    FINDINGS:  INTRACRANIAL CONTENTS: Acute/subacute left MCA inferior division territory infarcts involving the temporal, parietal, and occipital lobes. Punctate infarct in the left precentral gyrus. Small left subinsular infarct. Associated edema and mild local mass   effect in the areas of infarct. No midline shift. No mass, acute hemorrhage, or extra-axial fluid collections. Patchy nonspecific T2/FLAIR hyperintensities within the cerebral white matter most consistent with mild to moderate chronic microvascular   ischemic change. Mild generalized cerebral atrophy. No hydrocephalus. Normal position of the cerebellar tonsils. No pathologic contrast enhancement.    SELLA: No abnormality accounting for technique.    OSSEOUS STRUCTURES/SOFT TISSUES: Normal marrow signal. The major intracranial vascular flow voids are maintained. Incidental left parietal DVA.    ORBITS: Prior bilateral cataract surgery. Visualized portions of the orbits are otherwise unremarkable.     SINUSES/MASTOIDS: No paranasal sinus mucosal disease. No middle ear or mastoid effusion.       Impression    IMPRESSION:  1.  Acute/subacute infarcts predominantly in the left MCA inferior division territory corresponding to the known left MCA occlusion.  2.  No hemorrhagic conversion or midline shift.    Echocardiogram Complete     Value    LVEF  >70%    [facility]    [id number]  [id number]  [id number]  [id number]^STARR^[place] [address]     Name: HAI THURSTON  MRN: [id number]  : 1933  Study Date: 2024 11:41 AM  Age: 89 yrs  Gender: Female  Patient Location: [facility]  Reason For Study: Atrial Fibrillation  Ordering Physician: ZEENAT CLEMENTS  Performed By: RICH/GANGA     BSA: 1.9 m2  Height: 62 in  Weight: 205 lb  HR: 102  BP: 131/82 mmHg  ______________________________________________________________________________  Procedure  Complete Portable Echo Adult. Technically difficult study.  ______________________________________________________________________________  Interpretation Summary     Technically difficult exam  Hyperdynamic left ventricular function  The visual ejection fraction is >70%.  Mildly decreased right ventricular systolic function  The left atrium is mild to moderately dilated.  No significant valve disease.  ______________________________________________________________________________  Left Ventricle  The left ventricle is normal in size. There is normal left ventricular wall  thickness. Hyperdynamic left ventricular function. The visual ejection  fraction is >70%. Regional wall motion abnormalities cannot be excluded due to  limited visualization.     Right Ventricle  The right ventricle is normal size. TAPSE is abnormal, which is consistent  with abnormal right ventricular systolic function. Mildly decreased right  ventricular systolic function.     Atria  The left atrium is mild to moderately dilated. Right atrial size is normal.  Intact atrial septum.     Mitral Valve  There is moderate mitral annular calcification. There is mild (1+) mitral  regurgitation. There is no mitral valve stenosis.     Tricuspid Valve  Tricuspid valve leaflets appear normal. There is trace to mild tricuspid  regurgitation.     Aortic Valve  There is mild  trileaflet aortic sclerosis. No aortic regurgitation is present.     Pulmonic Valve  The pulmonic valve is not well seen, but is grossly normal. There is trace  pulmonic valvular regurgitation.     Vessels  The aorta root is normal. The inferior vena cava cannot be assessed.     Pericardium  There is no pericardial effusion.     ______________________________________________________________________________  MMode/2D Measurements & Calculations  IVSd: 1.4 cm  LVIDd: 3.3 cm  LVIDs: 2.3 cm  LVPWd: 0.91 cm  FS: 30.0 %     LV mass(C)d: 114.4 grams  LV mass(C)dI: 59.2 grams/m2  Ao root diam: 2.9 cm  LVOT diam: 2.0 cm  LVOT area: 3.1 cm2  Ao root diam index Ht(cm/m): 1.8  Ao root diam index BSA (cm/m2): 1.5  LA Volume (BP): 67.0 ml  LA Volume Index (BP): 34.7 ml/m2     LA Volume Indexed (AL/bp): 37.1 ml/m2  RV Base: 3.1 cm  RWT: 0.56     Doppler Measurements & Calculations  MV max P.1 mmHg  MV mean P.0 mmHg  MV V2 VTI: 14.2 cm  MVA(VTI): 2.3 cm2  Ao V2 max: 119.0 cm/sec  Ao max P.0 mmHg  Ao V2 mean: 91.7 cm/sec  Ao mean P.0 mmHg  Ao V2 VTI: 19.7 cm  TIFFANIE(I,D): 1.6 cm2  TIFFANIE(V,D): 1.9 cm2  LV V1 max P.1 mmHg  LV V1 max: 72.7 cm/sec  LV V1 VTI: 10.2 cm  SV(LVOT): 32.0 ml  SI(LVOT): 16.6 ml/m2  PA V2 max: 69.8 cm/sec  PA max P.9 mmHg  PA acc time: 0.11 sec  AV Woo Ratio (DI): 0.61  TIFFANIE Index (cm2/m2): 0.84     Peak E' Woo: 4.6 cm/sec  RV S Woo: 8.9 cm/sec     ______________________________________________________________________________  Report approved by: Altagracia Menezes 2024 12:58 PM             Discharge Medications   Current Discharge Medication List        START taking these medications    Details   apixaban ANTICOAGULANT (ELIQUIS) 5 MG tablet Take 1 tablet (5 mg) by mouth 2 times daily.  Qty: 90 tablet, Refills: 0    Associated Diagnoses: Acute stroke due to ischemia (H)      atorvastatin (LIPITOR) 80 MG tablet Take 1 tablet (80 mg) by mouth every evening.  Qty: 90 tablet, Refills: 0     Associated Diagnoses: Acute stroke due to ischemia (H)      diltiazem ER COATED BEADS (CARDIZEM CD/CARTIA XT) 120 MG 24 hr capsule Take 2 capsules (240 mg) by mouth daily.  Qty: 90 capsule, Refills: 0    Associated Diagnoses: Atrial fibrillation with RVR (H)           CONTINUE these medications which have NOT CHANGED    Details   acetaminophen (TYLENOL) 325 MG tablet Take 325-650 mg by mouth every 8 hours as needed.      aspirin (ASA) 325 MG EC tablet Take 325 mg by mouth daily as needed for moderate pain.      busPIRone (BUSPAR) 10 MG tablet Take 10 mg by mouth 2 times daily.      calcium carbonate (TUMS) 500 MG chewable tablet Take 2 chew tab by mouth daily as needed for heartburn.      cetirizine (ZYRTEC) 10 MG tablet Take 10 mg by mouth daily.      donepezil (ARICEPT) 10 MG tablet Take 10 mg by mouth once.      emollient (VANICREAM) external cream Apply topically daily. Apply to both legs for dry skin      hydrOXYzine HCl (ATARAX) 10 MG tablet Take 10 mg by mouth 2 times daily.      levothyroxine (SYNTHROID/LEVOTHROID) 75 MCG tablet Take 75 mcg by mouth daily.      mineral oil-hydrophilic petrolatum (AQUAPHOR) external ointment Apply topically 2 times daily as needed for dry skin.      mirtazapine (REMERON) 30 MG tablet Take 30 mg by mouth at bedtime.      triamcinolone (KENALOG) 0.1 % external cream Apply topically 2 times daily.      venlafaxine (EFFEXOR XR) 150 MG 24 hr capsule Take 150 mg by mouth daily.      vitamin E (TOCOPHEROL) 1000 units (450 mg) CAPS capsule Take 1 capsule by mouth 2 times daily.           STOP taking these medications       amLODIPine (NORVASC) 10 MG tablet Comments:   Reason for Stopping:             Allergies   Allergies   Allergen Reactions    Penicillins Unknown

## 2024-11-20 NOTE — PROVIDER NOTIFICATION
0620 notified house officer of increased HR. Rates more consistently 130's-140's and up to 150's-170's with activity. Per house officer 1x dose IV dilt push. After dilt HR now 80s-115.

## 2024-11-20 NOTE — PLAN OF CARE
Problem: Confusion Acute  Goal: Optimal Cognitive Function  Outcome: Not Progressing  Intervention: Minimize Contributing Factors  Recent Flowsheet Documentation  Taken 11/19/2024 2310 by Matilde Morrison RN  Sensory Stimulation Regulation: lighting decreased  Reorientation Measures: reorientation provided  Communication Support Strategies: one-step directions provided  Taken 11/19/2024 2051 by Matilde Morrison RN  Sensory Stimulation Regulation: lighting decreased  Reorientation Measures: reorientation provided  Communication Support Strategies: one-step directions provided   Goal Outcome Evaluation:    Disoriented x3-4. During the evening patient was very confused and scared. At times was hard to redirect, refusing medications, and occasionally swatting at staff. Had refused medications but when re approached was agreeable to taking. 1 dose IV dilt given for HR now rates resting 105-120. HR does increase to 150's-160's when doing activity. Up assist x1 to bathroom. After patient agreed to take evening medications she did become more compliant and directable and did end up sleeping a majority of the night.

## 2024-11-20 NOTE — PLAN OF CARE
Speech Language Therapy Discharge Summary    Reason for therapy discharge:    All goals and outcomes met, no further needs identified. Discharged back to memory care.    Progress towards therapy goal(s). See goals on Care Plan in [facility] electronic health record for goal details.  Goals met    Therapy recommendation(s):    No further therapy is recommended.Regular diet w/thin liquids, set up tray, allow her to feed herself. Receptive/expressive communication deficits along with cognitive impairment.

## 2024-11-20 NOTE — PROGRESS NOTES
Physical Therapy Discharge Summary    Reason for therapy discharge:    Discharged to memory care.    Progress towards therapy goal(s). See goals on Care Plan in [facility] electronic health record for goal details.  Goals partially met.  Barriers to achieving goals:   discharge from facility.    Therapy recommendation(s):    Further recommended therapy is related to documented deficits, and is necessary to maximize functional independence in order for patient to return to prior level of function.      Trinity Bowen DPT 11/20/2024

## 2024-11-20 NOTE — PROGRESS NOTES
Care Management Discharge Note    Discharge Date: 11/20/2024       Discharge Disposition: Assisted Living    Discharge Services:  [facility]    Discharge DME:  None     Discharge Transportation: family or friend will provide    Private pay costs discussed: Not applicable    Does the patient's insurance plan have a 3 day qualifying hospital stay waiver?  No    PAS Confirmation Code:  NA  Patient/family educated on Medicare website which has current facility and service quality ratings:  Yes    Education Provided on the Discharge Plan:  Yes    Persons Notified of Discharge Plans: Pt, pt's daughter Aimee, bedside RN, [facility],and Resident.     Patient/Family in Agreement with the Plan: yes    Handoff Referral Completed: Yes, non-[facility] PCP: External handoff communication completed    Additional Information:    Final discharge plan is for pt to return to [facility] today 11/20 ([address] [place] [id number]). Avelino Yu transporting between 1:00pm-2:00pm, likely closer to 2:00pm today.         Anna Rowan RN

## 2024-11-21 ENCOUNTER — PATIENT OUTREACH (OUTPATIENT)
Dept: CARE COORDINATION | Facility: CLINIC | Age: 89
End: 2024-11-21
Payer: COMMERCIAL

## 2024-11-21 NOTE — PROGRESS NOTES
[facility]    Background: Transitional Care Management program identified per system criteria and reviewed by [facility] Resource [place] team for possible outreach.    Assessment: Upon chart review, [facility] Team member will not proceed with patient outreach related to this episode of Transitional Care Management program due to reason below:    Patient has discharged to a Memory Care, Long-term Care, Assisted Living or Group Home where patient is receiving on-site support with their daily cares, including support with hospital follow up plan.    Patient discharged back to her Assisted Living Facility with Memory Care. No [facility] outreach call needed at this time.    Plan: Transitional Care Management episode addressed appropriately per reason noted above.      ADRIEL Raymond  [phone]  [facility]     *Connected Care Resource Team does NOT follow patient ongoing. Referrals are identified based on internal discharge reports and the outreach is to ensure patient has an understanding of their discharge instructions.

## 2024-11-27 LAB
ATRIAL RATE - MUSE: 153 BPM
DIASTOLIC BLOOD PRESSURE - MUSE: 76 MMHG
INTERPRETATION ECG - MUSE: NORMAL
P AXIS - MUSE: NORMAL DEGREES
PR INTERVAL - MUSE: NORMAL MS
QRS DURATION - MUSE: 90 MS
QT - MUSE: 288 MS
QTC - MUSE: 428 MS
R AXIS - MUSE: 44 DEGREES
SYSTOLIC BLOOD PRESSURE - MUSE: 101 MMHG
T AXIS - MUSE: 85 DEGREES
VENTRICULAR RATE- MUSE: 133 BPM

## 2025-06-26 ENCOUNTER — LAB REQUISITION (OUTPATIENT)
Dept: LAB | Facility: CLINIC | Age: OVER 89
End: 2025-06-26
Payer: COMMERCIAL

## 2025-06-26 DIAGNOSIS — E55.9 VITAMIN D DEFICIENCY, UNSPECIFIED: ICD-10-CM

## 2025-06-26 DIAGNOSIS — F03.90 UNSPECIFIED DEMENTIA, UNSPECIFIED SEVERITY, WITHOUT BEHAVIORAL DISTURBANCE, PSYCHOTIC DISTURBANCE, MOOD DISTURBANCE, AND ANXIETY (H): ICD-10-CM

## 2025-07-03 LAB
ALBUMIN SERPL BCG-MCNC: 4 G/DL (ref 3.5–5.2)
ALP SERPL-CCNC: 78 U/L (ref 40–150)
ALT SERPL W P-5'-P-CCNC: 11 U/L (ref 0–50)
ANION GAP SERPL CALCULATED.3IONS-SCNC: 11 MMOL/L (ref 7–15)
AST SERPL W P-5'-P-CCNC: 19 U/L (ref 0–45)
BILIRUB SERPL-MCNC: 0.3 MG/DL
BUN SERPL-MCNC: 16.8 MG/DL (ref 8–23)
CALCIUM SERPL-MCNC: 10.1 MG/DL (ref 8.8–10.4)
CHLORIDE SERPL-SCNC: 103 MMOL/L (ref 98–107)
CREAT SERPL-MCNC: 0.99 MG/DL (ref 0.51–0.95)
EGFRCR SERPLBLD CKD-EPI 2021: 54 ML/MIN/1.73M2
ERYTHROCYTE [DISTWIDTH] IN BLOOD BY AUTOMATED COUNT: 12.9 % (ref 10–15)
GLUCOSE SERPL-MCNC: 106 MG/DL (ref 70–99)
HCO3 SERPL-SCNC: 29 MMOL/L (ref 22–29)
HCT VFR BLD AUTO: 45.9 % (ref 35–47)
HGB BLD-MCNC: 15 G/DL (ref 11.7–15.7)
MCH RBC QN AUTO: 31.9 PG (ref 26.5–33)
MCHC RBC AUTO-ENTMCNC: 32.7 G/DL (ref 31.5–36.5)
MCV RBC AUTO: 98 FL (ref 78–100)
PLATELET # BLD AUTO: 327 10E3/UL (ref 150–450)
POTASSIUM SERPL-SCNC: 4.2 MMOL/L (ref 3.4–5.3)
PROT SERPL-MCNC: 7.1 G/DL (ref 6.4–8.3)
RBC # BLD AUTO: 4.7 10E6/UL (ref 3.8–5.2)
SODIUM SERPL-SCNC: 143 MMOL/L (ref 135–145)
TSH SERPL DL<=0.005 MIU/L-ACNC: 7.08 UIU/ML (ref 0.3–4.2)
VIT B12 SERPL-MCNC: 409 PG/ML (ref 232–1245)
VIT D+METAB SERPL-MCNC: 30 NG/ML (ref 20–50)
WBC # BLD AUTO: 5.3 10E3/UL (ref 4–11)

## 2025-07-03 PROCEDURE — 82607 VITAMIN B-12: CPT | Mod: ORL | Performed by: INTERNAL MEDICINE

## 2025-07-03 PROCEDURE — 82306 VITAMIN D 25 HYDROXY: CPT | Mod: ORL | Performed by: INTERNAL MEDICINE

## 2025-07-03 PROCEDURE — 84443 ASSAY THYROID STIM HORMONE: CPT | Mod: ORL | Performed by: INTERNAL MEDICINE

## 2025-07-03 PROCEDURE — 80053 COMPREHEN METABOLIC PANEL: CPT | Mod: ORL | Performed by: INTERNAL MEDICINE

## 2025-07-03 PROCEDURE — 85027 COMPLETE CBC AUTOMATED: CPT | Mod: ORL | Performed by: INTERNAL MEDICINE

## 2025-07-03 PROCEDURE — P9604 ONE-WAY ALLOW PRORATED TRIP: HCPCS | Mod: ORL | Performed by: INTERNAL MEDICINE

## 2025-07-03 PROCEDURE — 36415 COLL VENOUS BLD VENIPUNCTURE: CPT | Mod: ORL | Performed by: INTERNAL MEDICINE

## 2025-08-05 ENCOUNTER — LAB REQUISITION (OUTPATIENT)
Dept: LAB | Facility: CLINIC | Age: OVER 89
End: 2025-08-05
Payer: MEDICARE

## 2025-08-05 DIAGNOSIS — E03.9 HYPOTHYROIDISM, UNSPECIFIED: ICD-10-CM

## 2025-08-07 LAB
T4 FREE SERPL-MCNC: 0.89 NG/DL (ref 0.9–1.7)
TSH SERPL DL<=0.005 MIU/L-ACNC: 5.14 UIU/ML (ref 0.3–4.2)

## 2025-08-07 PROCEDURE — 84443 ASSAY THYROID STIM HORMONE: CPT | Mod: ORL | Performed by: INTERNAL MEDICINE

## 2025-08-07 PROCEDURE — 36415 COLL VENOUS BLD VENIPUNCTURE: CPT | Mod: ORL | Performed by: INTERNAL MEDICINE

## 2025-08-07 PROCEDURE — 84439 ASSAY OF FREE THYROXINE: CPT | Mod: ORL | Performed by: INTERNAL MEDICINE

## 2025-08-07 PROCEDURE — P9604 ONE-WAY ALLOW PRORATED TRIP: HCPCS | Mod: ORL | Performed by: INTERNAL MEDICINE

## 2025-08-19 ENCOUNTER — LAB REQUISITION (OUTPATIENT)
Dept: LAB | Facility: CLINIC | Age: OVER 89
End: 2025-08-19
Payer: MEDICARE

## 2025-08-19 DIAGNOSIS — E03.9 HYPOTHYROIDISM, UNSPECIFIED: ICD-10-CM

## 2025-08-21 LAB
T4 FREE SERPL-MCNC: 1.01 NG/DL (ref 0.9–1.7)
TSH SERPL DL<=0.005 MIU/L-ACNC: 3.13 UIU/ML (ref 0.3–4.2)